# Patient Record
Sex: FEMALE | Race: WHITE | Employment: OTHER | URBAN - METROPOLITAN AREA
[De-identification: names, ages, dates, MRNs, and addresses within clinical notes are randomized per-mention and may not be internally consistent; named-entity substitution may affect disease eponyms.]

---

## 2017-03-07 PROBLEM — E66.01 SEVERE OBESITY (BMI 35.0-39.9) WITH COMORBIDITY (HCC): Status: ACTIVE | Noted: 2017-03-07

## 2018-03-27 PROBLEM — E66.01 SEVERE OBESITY (BMI 35.0-39.9) WITH COMORBIDITY (HCC): Status: RESOLVED | Noted: 2017-03-07 | Resolved: 2018-03-27

## 2018-04-19 ENCOUNTER — APPOINTMENT (OUTPATIENT)
Dept: GENERAL RADIOLOGY | Facility: HOSPITAL | Age: 68
DRG: 871 | End: 2018-04-19
Payer: MEDICARE

## 2018-04-19 ENCOUNTER — APPOINTMENT (OUTPATIENT)
Dept: MRI IMAGING | Facility: HOSPITAL | Age: 68
DRG: 871 | End: 2018-04-19
Attending: EMERGENCY MEDICINE
Payer: MEDICARE

## 2018-04-19 ENCOUNTER — APPOINTMENT (OUTPATIENT)
Dept: CT IMAGING | Facility: HOSPITAL | Age: 68
DRG: 871 | End: 2018-04-19
Attending: EMERGENCY MEDICINE
Payer: MEDICARE

## 2018-04-19 ENCOUNTER — HOSPITAL ENCOUNTER (INPATIENT)
Facility: HOSPITAL | Age: 68
LOS: 9 days | Discharge: HOME HEALTH | DRG: 871 | End: 2018-04-28
Admitting: HOSPITALIST
Payer: MEDICARE

## 2018-04-19 DIAGNOSIS — I63.9 CEREBROVASCULAR ACCIDENT (CVA), UNSPECIFIED MECHANISM (HCC): ICD-10-CM

## 2018-04-19 DIAGNOSIS — A41.9 SEPSIS DUE TO UNDETERMINED ORGANISM (HCC): Primary | ICD-10-CM

## 2018-04-19 DIAGNOSIS — R51.9 ACUTE NONINTRACTABLE HEADACHE, UNSPECIFIED HEADACHE TYPE: ICD-10-CM

## 2018-04-19 PROCEDURE — 70544 MR ANGIOGRAPHY HEAD W/O DYE: CPT | Performed by: EMERGENCY MEDICINE

## 2018-04-19 PROCEDURE — 71260 CT THORAX DX C+: CPT | Performed by: EMERGENCY MEDICINE

## 2018-04-19 PROCEDURE — B33RZZZ MAGNETIC RESONANCE IMAGING (MRI) OF INTRACRANIAL ARTERIES: ICD-10-PCS | Performed by: RADIOLOGY

## 2018-04-19 PROCEDURE — 71045 X-RAY EXAM CHEST 1 VIEW: CPT

## 2018-04-19 PROCEDURE — 70450 CT HEAD/BRAIN W/O DYE: CPT | Performed by: EMERGENCY MEDICINE

## 2018-04-19 RX ORDER — IBUPROFEN 600 MG/1
600 TABLET ORAL ONCE
Status: COMPLETED | OUTPATIENT
Start: 2018-04-19 | End: 2018-04-19

## 2018-04-19 RX ORDER — ACETAMINOPHEN 325 MG/1
650 TABLET ORAL EVERY 6 HOURS PRN
Status: DISCONTINUED | OUTPATIENT
Start: 2018-04-19 | End: 2018-04-28

## 2018-04-19 RX ORDER — IPRATROPIUM BROMIDE AND ALBUTEROL SULFATE 2.5; .5 MG/3ML; MG/3ML
3 SOLUTION RESPIRATORY (INHALATION) ONCE
Status: COMPLETED | OUTPATIENT
Start: 2018-04-19 | End: 2018-04-19

## 2018-04-19 RX ORDER — ACETAMINOPHEN 500 MG
1000 TABLET ORAL ONCE
Status: COMPLETED | OUTPATIENT
Start: 2018-04-19 | End: 2018-04-19

## 2018-04-19 RX ORDER — HEPARIN SODIUM 5000 [USP'U]/ML
5000 INJECTION, SOLUTION INTRAVENOUS; SUBCUTANEOUS EVERY 12 HOURS SCHEDULED
Status: DISCONTINUED | OUTPATIENT
Start: 2018-04-19 | End: 2018-04-22

## 2018-04-19 RX ORDER — HYDROCODONE BITARTRATE AND ACETAMINOPHEN 5; 325 MG/1; MG/1
1 TABLET ORAL EVERY 6 HOURS PRN
Status: DISCONTINUED | OUTPATIENT
Start: 2018-04-19 | End: 2018-04-25

## 2018-04-19 RX ORDER — HYDROMORPHONE HYDROCHLORIDE 1 MG/ML
0.5 INJECTION, SOLUTION INTRAMUSCULAR; INTRAVENOUS; SUBCUTANEOUS EVERY 2 HOUR PRN
Status: DISCONTINUED | OUTPATIENT
Start: 2018-04-19 | End: 2018-04-25

## 2018-04-19 RX ORDER — SODIUM CHLORIDE 0.9 % (FLUSH) 0.9 %
3 SYRINGE (ML) INJECTION AS NEEDED
Status: DISCONTINUED | OUTPATIENT
Start: 2018-04-19 | End: 2018-04-28

## 2018-04-19 RX ORDER — ACETAMINOPHEN 500 MG
TABLET ORAL
Status: COMPLETED
Start: 2018-04-19 | End: 2018-04-19

## 2018-04-19 RX ORDER — GUAIFENESIN 100 MG/5ML
100 SOLUTION ORAL EVERY 6 HOURS PRN
Status: DISCONTINUED | OUTPATIENT
Start: 2018-04-19 | End: 2018-04-28

## 2018-04-19 RX ORDER — DIPHENHYDRAMINE HYDROCHLORIDE 50 MG/ML
25 INJECTION INTRAMUSCULAR; INTRAVENOUS ONCE
Status: COMPLETED | OUTPATIENT
Start: 2018-04-19 | End: 2018-04-19

## 2018-04-19 RX ORDER — ONDANSETRON 2 MG/ML
4 INJECTION INTRAMUSCULAR; INTRAVENOUS EVERY 6 HOURS PRN
Status: DISCONTINUED | OUTPATIENT
Start: 2018-04-19 | End: 2018-04-28

## 2018-04-19 RX ORDER — HYDROMORPHONE HYDROCHLORIDE 1 MG/ML
1 INJECTION, SOLUTION INTRAMUSCULAR; INTRAVENOUS; SUBCUTANEOUS EVERY 2 HOUR PRN
Status: DISCONTINUED | OUTPATIENT
Start: 2018-04-19 | End: 2018-04-25

## 2018-04-19 RX ORDER — METOCLOPRAMIDE HYDROCHLORIDE 5 MG/ML
10 INJECTION INTRAMUSCULAR; INTRAVENOUS ONCE
Status: COMPLETED | OUTPATIENT
Start: 2018-04-19 | End: 2018-04-19

## 2018-04-19 RX ORDER — DEXAMETHASONE SODIUM PHOSPHATE 4 MG/ML
10 VIAL (ML) INJECTION ONCE
Status: COMPLETED | OUTPATIENT
Start: 2018-04-19 | End: 2018-04-19

## 2018-04-19 NOTE — ED NOTES
Care assumed from triage To room via [facility] Max assist of 2 to transfer to bed Responds to verbal stim, Presents from home in care of significant other with 3 day hx cough, fever, HA and L ear pain with verbalized \"bleeding from ear\" + tight bronchospastic c

## 2018-04-19 NOTE — H&P
[facility] Hospitalist H&P       CC: Patient presents with:  Breathing Problem  Ear Problem     PCP: Braxton Carias MD    ASSESSMENT / PLAN:     Ms. Aldo Valdez is a 77 F with PMH of HTN, HL, hypothyroidism who presented with fever, cough, L ear bleeding. about 10 years.      PMH  Past Medical History:   Diagnosis Date   • Essential hypertension    • Hyperlipidemia    • Thyroid disease         PSH  Past Surgical History:  4/16/2015: COLON CA SCRN NOT HI RSK IND N/A      Comment: Procedure: COLONOSCOPY, POSSI 04/19/2018    04/19/2018   K 2.7 04/19/2018   CL 96 04/19/2018   CO2 29 04/19/2018    04/19/2018   CA 8.8 04/19/2018   PTT 26.3 04/19/2018   INR 1.0 04/19/2018       No results for input(s): TROP in the last 72 hours.     Additional Diagnostics

## 2018-04-19 NOTE — ED PROVIDER NOTES
Patient Seen in: [facility] AND [facility] Emergency Department     History   Patient presents with:  Breathing Problem  Ear Problem    Stated Complaint: sob/cough/left ear bleeding    HPI    77year old female brought for evaluation of cough for the past 3 weeks, Comment: quit about 40 yrs. ago  Alcohol use: Yes               Review of Systems    Positive for stated complaint: sob/cough/left ear bleeding  Other systems are as noted in HPI. Constitutional and vital signs reviewed.       All other systems reviewed an Neurological: She is alert and oriented to person, place, and time. No cranial nerve deficit. She exhibits normal muscle tone. She displays no seizure activity. Coordination normal.   Skin: Skin is intact. No petechiae noted. She is not diaphoretic.  No cya CARDIAC/VASC: Borderline cardiomegaly with normal caliber pulmonary     vessels. MEDIAST/JOESPH:   No visible mass or adenopathy. LUNGS/PLEURA: Moderate elevation left hemidiaphragm. No significant     pulmonary parenchymal abnormalities.   No effus Temp: 102 °F (38.9 °C)    TempSrc: Temporal    SpO2: 94% 95%   Weight: 102.1 kg    Height: 175.3 cm (5' 9\")      *I personally reviewed and interpreted all ED vitals.     MDM     Emergency Differential Diagnosis: Sepsis, unknown source, given cough, pneumo overall clinical presentation including general toxicity and distal perfusion are no significant change.  hemodynamic function is no significant change.      Diagnostic Considerations and Interpretation of abnormal or significant results:  ED Course as of A Condition upon leaving the department: Stable

## 2018-04-19 NOTE — PROGRESS NOTES
[facility] Pharmacy Note: Antimicrobial Weight Dose Adjustment for: piperacillin/tazobactam ([place])    ZAKI Coronado is a 77year old female who has been prescribed piperacillin/tazobactam (ZOSYN) 3.375 gm every 8 hours.   CrCl is estimated creatinine clearance is

## 2018-04-19 NOTE — ED INITIAL ASSESSMENT (HPI)
Sob, cough, and general weakness. Also states her left ear bled this morning- no active bleeding in triage.

## 2018-04-20 ENCOUNTER — APPOINTMENT (OUTPATIENT)
Dept: ULTRASOUND IMAGING | Facility: HOSPITAL | Age: 68
DRG: 871 | End: 2018-04-20
Attending: HOSPITALIST
Payer: MEDICARE

## 2018-04-20 PROCEDURE — 93880 EXTRACRANIAL BILAT STUDY: CPT | Performed by: HOSPITALIST

## 2018-04-20 RX ORDER — 0.9 % SODIUM CHLORIDE 0.9 %
VIAL (ML) INJECTION
Status: COMPLETED
Start: 2018-04-20 | End: 2018-04-20

## 2018-04-20 RX ORDER — LEVOTHYROXINE SODIUM 0.12 MG/1
125 TABLET ORAL DAILY
Status: DISCONTINUED | OUTPATIENT
Start: 2018-04-20 | End: 2018-04-28

## 2018-04-20 RX ORDER — POTASSIUM CHLORIDE 20 MEQ/1
40 TABLET, EXTENDED RELEASE ORAL EVERY 4 HOURS
Status: COMPLETED | OUTPATIENT
Start: 2018-04-20 | End: 2018-04-20

## 2018-04-20 RX ORDER — IPRATROPIUM BROMIDE AND ALBUTEROL SULFATE 2.5; .5 MG/3ML; MG/3ML
3 SOLUTION RESPIRATORY (INHALATION) EVERY 4 HOURS PRN
Status: DISCONTINUED | OUTPATIENT
Start: 2018-04-20 | End: 2018-04-28

## 2018-04-20 RX ORDER — ATORVASTATIN CALCIUM 40 MG/1
40 TABLET, FILM COATED ORAL NIGHTLY
Status: DISCONTINUED | OUTPATIENT
Start: 2018-04-20 | End: 2018-04-28

## 2018-04-20 RX ORDER — MAGNESIUM OXIDE 400 MG (241.3 MG MAGNESIUM) TABLET
400 TABLET ONCE
Status: COMPLETED | OUTPATIENT
Start: 2018-04-20 | End: 2018-04-20

## 2018-04-20 NOTE — CM/SW NOTE
04/20/18 1200   CM/SW Screening   Referral Source Nurse;Case [facility] staff; Chart review;Nursing rounds   Patient's Mental Status Alert;Oriented   Patient's [address]   Patient lives with (significant other)   Patien

## 2018-04-20 NOTE — CONSULTS
Hem Onc:Consult Note          SUBJECTIVE:     Reason for Consultation: Lung mass    History of Present Illness:  Patient is a 77year old, female presented with complaints of cough, Sob, and fevers. Symptoms started about 3 weeks ago. No hemoptysis.  She a Surgical History:  4/16/2015: COLON CA SCRN NOT HI RSK IND N/A      Comment: Procedure: COLONOSCOPY, POSSIBLE BIOPSY,                POSSIBLE POLYPECTOMY 57537;  Surgeon:  Dolores Arreaga MD;  Location: [address] [place] [address], None.     MEDIASTINUM/VASCULATURE:           There is an enlarged 1.7 x 1.6 cm subcarinal lymph node. There is an enlarged 1.5 x 1.1 cm right lower paratracheal lymph node.  There are other multiple mildly prominent mediastinal lymph nodes which are nonspec segmental and subsegmental pulmonary arteries due to respiratory motion artifact. Dilated main pulmonary artery can be seen with pulmonary hypertension. 2.5 cm medial right upper lobe mass with mediastinal lymphadenopathy.  Findings are suspicious f vasculature.      Lab Results:   HEM:    Lab Results  Component Value Date   WBC 15.9 04/20/2018   RBC 3.68 04/20/2018   HGB 11.8 04/20/2018   HCT 34.8 04/20/2018   MCV 94.3 04/20/2018   MCH 31.9 04/20/2018   MCHC 33.8 04/20/2018   RDW 13.8 04/20/2018   PLT

## 2018-04-20 NOTE — PLAN OF CARE
Problem: Patient/Family Goals  Goal: Patient/Family Long Term Goal  Patient's Long Term Goal: Go home     Interventions:  - Monitor labs/vitals   - await results for MRI   - oncology consult   - manage pain   - See additional Care Plan goals for specific i breathe, Incentive Spirometry  - Assess the need for suctioning and perform as needed  - Assess and instruct to report SOB or any respiratory difficulty  - Respiratory Therapy support as indicated  - Manage/alleviate anxiety  - Monitor for signs/symptoms o environment to reduce risk of injury  - Provide assistive devices as appropriate  - Consider OT/PT consult to assist with strengthening/mobility  - Encourage toileting schedule  Outcome: Progressing  Fall risk precautions are in place with bed alarm on, ca

## 2018-04-20 NOTE — CM/SW NOTE
SW received order for advanced directives. SW met w/ pt to discuss eventual discharge needs. Pt lives at home w/ her significant other in [facility] [address]. Pt lives in [address] w/ 5 external steps and 7 stairs to the bedroom.  Pt has 2 daughters that are

## 2018-04-20 NOTE — CONSULTS
Pulmonary H&P/Consult     NAME: Miri Rico - ROOM: 641/[facility]-P - MRN: [id number] - Age: 77year old - :  4/10/1950    Date of Admission: 2018  2:15 PM  Admission Diagnosis: Sepsis due to undetermined [facility] [A41.9]  Acute nonintractable hea Onset   • Breast Cancer Mother      age 76      Smoking status: Former Smoker     Smokeless tobacco: Never Used    Comment: quit about 40 yrs.  ago    Alcohol use Yes     Medications:  • magnesium oxide  400 mg Oral Once   • Potassium Chloride ER  40 mEq Or where noted.

## 2018-04-20 NOTE — PHYSICAL THERAPY NOTE
PHYSICAL THERAPY EVALUATION - INPATIENT     Room Number: 441/125-O  Evaluation Date: 4/20/2018  Type of Evaluation: Initial   Physician Order: PT Eval and Treat    Presenting Problem: Fever, cough, SOB, weakness, hypoxia - Sepsis;  Headache, dizziness  Rollo Eduin needs in reach eating breakfast, RN aware. Patient will benefit from continued IP PT services to address these deficits in preparation for discharge.     DISCHARGE RECOMMENDATIONS  PT Discharge Recommendations: Undetermined;Cont skilled therapy in a supe been very ill and is dying, currently in hospice and she has been visiting him regularly.     SUBJECTIVE  \"I feel horrible but I want to sit up to eat\"    PHYSICAL THERAPY EXAMINATION     OBJECTIVE  Precautions: Hard of hearing;Bed/chair alarm (Bed alarm in hospital room?: A Lot   -   Climbing 3-5 steps with a railing?: A Lot     AM-PAC Score:  Raw Score: 16   PT Approx Degree of Impairment Score: 54.16%   Standardized Score (AM-PAC Scale): 40.78   CMS Modifier (G-Code): CK    FUNCTIONAL ABILITY STATUS  Ga Goal #6  Current Status

## 2018-04-20 NOTE — PROGRESS NOTES
[facility] Hospitalist Progress Note     CC: Hospital Follow up    PCP: Segundo Junior MD       Assessment/Plan:     Principal Problem:    Sepsis due to undetermined organism [facility]  Active Problems:    Acute nonintractable headache, unspecified headache t significant cough. OBJECTIVE:    Blood pressure 124/57, pulse 71, temperature 98.7 °F (37.1 °C), temperature source Oral, resp. rate 18, height 175.3 cm (5' 9\"), weight 225 lb (102.1 kg), SpO2 94 %.     Temp:  [98.1 °F (36.7 °C)-102 °F (38.9 °C)] 98.7 Date: 4/19/2018  CONCLUSION:  Mild chronic microvascular ischemic disease without acute intracranial abnormality. Severe diffuse paranasal sinus disease. Near-complete opacification of the bilateral mastoid air cells may be sequela of inflammation.  No o to be less likely but is also in the differential.    Dictated by (CST): Tristian Benson MD on 4/19/2018 at 17:03     Approved by (CST): Tristian Benson MD on 4/19/2018 at 17:13              Meds:     • atorvastatin  40 mg Oral Nightly   • Levothyroxine Sodium

## 2018-04-20 NOTE — OCCUPATIONAL THERAPY NOTE
OCCUPATIONAL THERAPY EVALUATION - INPATIENT     Room Number: 254/379-V  Evaluation Date: 4/20/2018  Type of Evaluation: Initial  Presenting Problem:  (sepsis, sob.cough)    Physician Order: IP Consult to Occupational Therapy  Reason for Therapy: ADL/IADL D Problem:    Sepsis due to undetermined organism [facility])  Active Problems:    Acute nonintractable headache, unspecified headache type      Past Medical History  Past Medical History:   Diagnosis Date   • Essential hypertension    • Hyperlipidemia    • Thyroi Motor: WFL       ACTIVITIES OF DAILY LIVING ASSESSMENT  AM-PAC ‘6-Clicks’ Inpatient Daily Activity Short Form  How much help from another person does the patient currently need…  -   Putting on and taking off regular lower body clothing?: A Little  -   [email or web address]

## 2018-04-20 NOTE — PLAN OF CARE
Problem: Patient/Family Goals  Goal: Patient/Family Long Term Goal  Patient's Long Term Goal: Go home     Interventions:  - Monitor labs/vitals   - await results for MRI   - oncology consult   - manage pain   - See additional Care Plan goals for specific i Verbalizes/displays adequate comfort level or patient's stated pain goal  INTERVENTIONS:  - Encourage pt to monitor pain and request assistance  - Assess pain using appropriate pain scale  - Administer analgesics based on type and severity of pain and eval continue to monitor.

## 2018-04-21 RX ORDER — POTASSIUM CHLORIDE 20 MEQ/1
40 TABLET, EXTENDED RELEASE ORAL EVERY 4 HOURS
Status: COMPLETED | OUTPATIENT
Start: 2018-04-21 | End: 2018-04-21

## 2018-04-21 RX ORDER — ECHINACEA PURPUREA EXTRACT 125 MG
2 TABLET ORAL
Status: DISCONTINUED | OUTPATIENT
Start: 2018-04-21 | End: 2018-04-28

## 2018-04-21 RX ORDER — TRAZODONE HYDROCHLORIDE 50 MG/1
25 TABLET ORAL NIGHTLY PRN
Status: DISCONTINUED | OUTPATIENT
Start: 2018-04-21 | End: 2018-04-28

## 2018-04-21 RX ORDER — BENZONATATE 100 MG/1
200 CAPSULE ORAL 3 TIMES DAILY PRN
Status: DISCONTINUED | OUTPATIENT
Start: 2018-04-21 | End: 2018-04-28

## 2018-04-21 RX ORDER — 0.9 % SODIUM CHLORIDE 0.9 %
VIAL (ML) INJECTION
Status: COMPLETED
Start: 2018-04-21 | End: 2018-04-21

## 2018-04-21 NOTE — PROGRESS NOTES
Dr. Yadira Vasquez paged, per tele patient had 2.5 sec run of SVT -214; patient asymptomatic.  /55, HR 79, pox 97% O2 3L. Patient also has fever  awaiting call back.   Dr Hira Stanton on call for Dr. Yadira Vasquez notfied of above, no new orders, continue to

## 2018-04-21 NOTE — PROGRESS NOTES
Pulmonary Progress Note     Assessment / Plan:  1. Abnormal CT Chest - RUL 2.5cm medial nodule with associated mediastinal lymphadenopathy. There is also an enlarged right chest wall lymph node of unclear significance.  DDx includes malignancy and infectiou

## 2018-04-21 NOTE — CONSULTS
ENT consult  Asked to see patient by Dr. Clari Hope and discussed case with   Coni Max. 76 y/o female admitted 2 days ago with fever to 102. She subsequently has positive blood cultures for H influenza.   Her CT shows both pansinusitis and bilateral fluid

## 2018-04-21 NOTE — CONSULTS
[facility]    PATIENT'S NAME: Philly HARVEY   ATTENDING PHYSICIAN: Hayden Orozco. Priya Duffy MD   CONSULTING PHYSICIAN: Jennifer Hernandez MD   PATIENT ACCOUNT#:   [id number]    LOCATION:  [address] [place] [address] #:   [id number]       DATE OF BIR masses, rebound, or organomegaly. EXTREMITIES:  No clubbing, cyanosis, edema, or phlebitis appreciated. IV site okay. LABORATORY DATA:  White count is 15.3, hemoglobin 11.1, platelets 003,708. BUN 11, creatinine 0.67.   Blood cultures have grown Haemo

## 2018-04-21 NOTE — PROGRESS NOTES
[facility] Hospitalist Progress Note     CC: Hospital Follow up    PCP: Fabian Lin MD       Assessment/Plan:     Principal Problem:    Sepsis due to undetermined organism [facility]  Active Problems:    Acute nonintractable headache, unspecified headache t OBJECTIVE:    Blood pressure 135/71, pulse 71, temperature 97.6 °F (36.4 °C), temperature source Oral, resp. rate 18, height 5' 9\" (1.753 m), weight 225 lb (102.1 kg), SpO2 96 %.     Temp:  [97.6 °F (36.4 °C)-101.5 °F (38.6 °C)] 97.6 °F (36.4 °C)  Puls acute findings.      Dictated by (CST): Mckenzie Nina MD on 4/19/2018 at 15:17     Approved by (CST): Nain Umaña MD on 4/19/2018 at 15:18          Ct Brain Or Head (91258)    Result Date: 4/19/2018  CONCLUSION:  Mild chronic microvasc embolus in the central, main or lobar pulmonary arteries. Nondiagnostic in the segmental and subsegmental pulmonary arteries due to respiratory motion artifact. Dilated main pulmonary artery can be seen with pulmonary hypertension.   2.5 cm medial right up

## 2018-04-21 NOTE — CONSULTS
Hem Onc:Consult Note          SUBJECTIVE:     Reason for Consultation: Lung mass    Interval history:  Noted to have H. Influenza bacteremia and pneumonia. On antibiotics per ID and ENT.     at bedside          Current medications:    Current Facili normocephalic, no scleral icterus, oropharynx clear  Neck: supple, no adenopathy  Heart: regular rate  Lungs: coughing; decreased breath sounds.    Abdomen: soft, non-tender, non-distended  Extremity: no LE edema  Skin: No rashes or lesions  Lymph nodes: Ce mass abutting the medial aspect of the pleura. There are bibasilar airspace opacities, suggestive of   atelectasis. There is volume loss the left hemithorax.  Decreased anterior posterior dimension trachea with anterior bowing of the posterior wall suggesti TECHNIQUE:    CT images were obtained without contrast material.  Automated exposure control for dose reduction was used.        FINDINGS:          PARENCHYMA:             Diffuse low attenuation is seen within the bilateral periventricular white matter c ENT  2. pulm rex appreciated; plan for EBUS in near future; may be outpatient pending   3. Discussed CT scan results and findings in the lung which can be from infection, but also concerning for malignancy.    4. Discussed that we need biopsy to confirm th

## 2018-04-21 NOTE — CONSULTS
Ferdinand Jones is a 77year old female.    Patient presents with:  Breathing Problem  Ear Problem      HPI:    No flu shot this year ;did get pneumovax;lost hearing 48 hrs pta;sick father had uri prior to pt and she feels like these are same sx  Fluid out of suggested  4. Follow cxr ;hopefully this is all infection;quit cigs ~ 35+ yrs ago  5.  Thanks, #[id number]            Lab Results  Component Value Date   WBC 15.3 04/21/2018   HGB 11.1 04/21/2018   HCT 32.5 04/21/2018    04/21/2018   CREATSERUM 0.67 04/21/2 Microscopic Microscopic not indicated    -PROTHROMBIN TIME (PT)   Result Value Ref Range   PT 13.2 11.8 - 14.5 seconds   INR 1.0 0.9 - 1.2   -PTT, ACTIVATED   Result Value Ref Range   PTT 26.3 23.2 - 35.3 seconds   -MAGNESIUM   Result Value Ref Range   M Auto Resulted    -RAINBOW DRAW DARK GREEN   Result Value Ref Range   Hold Lt Green Auto Resulted    -RAINBOW DRAW LIGHT GREEN   Result Value Ref Range   Hold Lt Green Auto Resulted    -RAINBOW DRAW GOLD   Result Value Ref Range   Hold Gold Auto Resulted 37.0 g/dl   RDW 13.7 11.0 - 15.0 %    140 - 400 K/UL   MPV 8.6 7.4 - 10.3 fL   Neutrophil % 77 %   Lymphocyte % 12 %   Monocyte % 11 %   Eosinophil % 0 %   Basophil % 1 %   Neutrophil Absolute 8.3 (H) 1.8 - 7.7 K/UL   Lymphocyte Absolute 1.3 1.0 - 4

## 2018-04-22 ENCOUNTER — APPOINTMENT (OUTPATIENT)
Dept: CV DIAGNOSTICS | Facility: HOSPITAL | Age: 68
DRG: 871 | End: 2018-04-22
Attending: HOSPITALIST
Payer: MEDICARE

## 2018-04-22 ENCOUNTER — APPOINTMENT (OUTPATIENT)
Dept: MRI IMAGING | Facility: HOSPITAL | Age: 68
DRG: 871 | End: 2018-04-22
Attending: EMERGENCY MEDICINE
Payer: MEDICARE

## 2018-04-22 PROCEDURE — 99223 1ST HOSP IP/OBS HIGH 75: CPT | Performed by: OTHER

## 2018-04-22 PROCEDURE — 93306 TTE W/DOPPLER COMPLETE: CPT | Performed by: HOSPITALIST

## 2018-04-22 PROCEDURE — 70553 MRI BRAIN STEM W/O & W/DYE: CPT | Performed by: EMERGENCY MEDICINE

## 2018-04-22 RX ORDER — SODIUM CHLORIDE 0.9 % (FLUSH) 0.9 %
10 SYRINGE (ML) INJECTION AS NEEDED
Status: DISCONTINUED | OUTPATIENT
Start: 2018-04-22 | End: 2018-04-28

## 2018-04-22 RX ORDER — HEPARIN SODIUM 5000 [USP'U]/ML
5000 INJECTION, SOLUTION INTRAVENOUS; SUBCUTANEOUS EVERY 12 HOURS SCHEDULED
Status: DISCONTINUED | OUTPATIENT
Start: 2018-04-22 | End: 2018-04-22

## 2018-04-22 RX ORDER — HEPARIN SODIUM 5000 [USP'U]/ML
5000 INJECTION, SOLUTION INTRAVENOUS; SUBCUTANEOUS EVERY 12 HOURS SCHEDULED
Status: DISCONTINUED | OUTPATIENT
Start: 2018-04-22 | End: 2018-04-28

## 2018-04-22 RX ORDER — POTASSIUM CHLORIDE 20 MEQ/1
40 TABLET, EXTENDED RELEASE ORAL ONCE
Status: COMPLETED | OUTPATIENT
Start: 2018-04-22 | End: 2018-04-22

## 2018-04-22 RX ORDER — SODIUM CHLORIDE 9 MG/ML
INJECTION, SOLUTION INTRAVENOUS CONTINUOUS
Status: DISCONTINUED | OUTPATIENT
Start: 2018-04-22 | End: 2018-04-28

## 2018-04-22 NOTE — PHYSICAL THERAPY NOTE
PHYSICAL THERAPY TREATMENT NOTE - INPATIENT     Room Number: 980/845-M       Presenting Problem: Fever, cough, SOB, weakness, hypoxia - Sepsis;  Headache, dizziness    Problem List  Principal Problem:    Sepsis due to undetermined organism [facility])  Active Pro Static Sitting: Good  Dynamic Sitting: Fair +           Static Standing: Fair -  Dynamic Standing: Poor +    ACTIVITY TOLERANCE  O2 Device: Nasal cannula  Liters of O2:  3  No shortness of breath    AM-PAC '6-Clicks' INPATIENT SHORT FORM - Goal #3 Patient is able to ambulate 300 feet with assist device: walker - rolling at assistance level: modified independent   Goal #3   Current Status 15 feet with rolling walker Min assist    Goal #4 Patient will negotiate 7 stairs/one curb w/ assistive

## 2018-04-22 NOTE — PROGRESS NOTES
Sherryle Linea is a 77year old female. Patient presents with:  Breathing Problem  Ear Problem    HPI:    Ongoing headaches and [place]    REVIEW OF SYSTEMS:   A comprehensive 10 point review of systems was completed.   Pertinent positives and negatives noted i Urine 40  (A) Negative mg/dL   WBC Urine 0 0 - 5 /HPF   RBC URINE 0 0 - 3 /HPF   Bacteria Urine Negative None Seen /HPF   Microscopic Microscopic not indicated    -PROTHROMBIN TIME (PT)   Result Value Ref Range   PT 13.2 11.8 - 14.5 seconds   INR 1.0 0.9 - Result Value Ref Range   Potassium 4.3 3.3 - 5.1 mmol/L   -BASIC METABOLIC PANEL (8)   Result Value Ref Range   Glucose 97 70 - 99 mg/dL   Sodium 138 136 - 144 mmol/L   Potassium 3.8 3.3 - 5.1 mmol/L   Chloride 104 95 - 110 mmol/L   CO2 28 22 - 32 mmol/L Haemophilus influenza (A)    Blood Smear Positive Blood Culture    Blood Smear Gram Negative Rods    -BLOOD CULTURE   Result Value Ref Range   Blood Culture Result Haemophilus influenza (A)    Blood Smear Positive Blood Culture    Blood Smear Gram Negative RBC 3.42 (L) 3.70 - 5.40 M/UL   HGB 10.9 (L) 12.0 - 16.0 g/dL   HCT 32.5 (L) 35.0 - 48.0 %   MCV 95.1 80.0 - 100.0 fL   MCH 31.9 27.0 - 32.0 pg   MCHC 33.6 32.0 - 37.0 g/dl   RDW 14.0 11.0 - 15.0 %    140 - 400 K/UL   MPV 8.5 7.4 - 10.3 fL   Neutr

## 2018-04-22 NOTE — CONSULTS
Neurology Inpatient Consult Note    Rachael Johnson : 4/10/1950   Referring Physician: Dr. Jhony Clarke  HPI:     Rachael Johnson is a 77year old female who is being seen in neurologic evaluation.     Patient is being seen in evaluation for multiple emboli se kathy  HEENT: See HPI  RESPIRATORY: See HPI  CARDIOVASCULAR: no chest pain, no palpitations  GI: no nausea, no vomiting  GENITAL/: no dysuria, no frequency  MUSCULOSKELETAL: General weakness  PSYCH: no depression, no anxiety  NEURO: see HPI    EXAM: bifurcation/proximal ICA plaque without hemodynamic significance. 2. Nonvisualized right vertebral artery/occlusion of undetermined age corresponds to recent MRA Brain and CT chest (4/19/18). .   3. Antegrade flow left vertebral artery.        MRA brain  CO involve cardiology for KRIS    –Final blood culture results pending    –Continue statin; LDL is at goal less than 70    –Due to high risk of hemorrhagic conversion in the case of septic emboli, would hold off on antiplatelet and full dose anticoagulation fo

## 2018-04-22 NOTE — PROGRESS NOTES
[facility] Hospitalist Progress Note     CC: Hospital Follow up    PCP: Briseyda Jarvis MD       Assessment/Plan:     Principal Problem:    Sepsis due to undetermined organism [facility]  Active Problems:    Acute nonintractable headache, unspecified headache t DO  [facility] Hospitalist  Answering Service number: [phone]     Subjective:     Feels very tired, still w/ cough but a little better compared to yesterday  Denies cp, abd pain, n/v     OBJECTIVE:    Blood pressure 135/69, pulse 84, temperature 98.3 °F (36. --   28    < > = values in this interval not displayed. No results for input(s): ALT, AST, ALB, AMYLASE, LIPASE, LDH in the last 72 hours.     Invalid input(s): ALPHOS, TBIL, DBIL, TPROT      Imaging:  Xr Chest Ap/pa (1 View) (cpt=71045)    Result John intraventricular restricted diffusion within the atria right lateral ventricle and the left occipital horn. No abnormal enhancement or ventriculitis. Presumed subacute IVH isodense on recent noncontrast CT from 4/19/18. 3. Cerebral cortical atrophy.  Chroni Dictated by (CST): Andrew Castellanos MD on 4/19/2018 at 17:03     Approved by (CST): Andrew Castellanos MD on 4/19/2018 at 17:13              Meds:     • cefTRIAXone  2 g Intravenous Q12H   • Saline Nasal Spray  2 spray Each Nare TID   • atorvastatin  40 mg Oral N

## 2018-04-22 NOTE — PROGRESS NOTES
Order received to transfer patient to CV. Report given to Kymberly Valadez in agreement with transfer, patient and dtr, Naoma Peak in agreement with transfer. Patient transferred per bed with tele.

## 2018-04-22 NOTE — PLAN OF CARE
Problem: Patient/Family Goals  Goal: Patient/Family Long Term Goal  Patient's Long Term Goal: Go home     Interventions:  - Monitor labs/vitals   - await results for MRI   - oncology consult   - manage pain   - See additional Care Plan goals for specific i ADULT  Goal: Verbalizes/displays adequate comfort level or patient's stated pain goal  INTERVENTIONS:  - Encourage pt to monitor pain and request assistance  - Assess pain using appropriate pain scale  - Administer analgesics based on type and severity of

## 2018-04-22 NOTE — PROGRESS NOTES
ENT  Patient not available to see in MRI  Febrile yesterday 101.5 prior to the rocephin and 101.2 at 8:30 pm.  No fever since then. Spoke to her son at the bedside who reports that she is beginning to feel better. Imp: H.  Influenza bacteremia source eith

## 2018-04-23 ENCOUNTER — APPOINTMENT (OUTPATIENT)
Dept: CV DIAGNOSTICS | Facility: HOSPITAL | Age: 68
DRG: 871 | End: 2018-04-23
Attending: INTERNAL MEDICINE
Payer: MEDICARE

## 2018-04-23 ENCOUNTER — APPOINTMENT (OUTPATIENT)
Dept: INTERVENTIONAL RADIOLOGY/VASCULAR | Facility: HOSPITAL | Age: 68
DRG: 871 | End: 2018-04-23
Attending: INTERNAL MEDICINE
Payer: MEDICARE

## 2018-04-23 PROCEDURE — 93320 DOPPLER ECHO COMPLETE: CPT | Performed by: INTERNAL MEDICINE

## 2018-04-23 PROCEDURE — 93325 DOPPLER ECHO COLOR FLOW MAPG: CPT | Performed by: INTERNAL MEDICINE

## 2018-04-23 PROCEDURE — 99231 SBSQ HOSP IP/OBS SF/LOW 25: CPT | Performed by: OTHER

## 2018-04-23 PROCEDURE — B246ZZ4 ULTRASONOGRAPHY OF RIGHT AND LEFT HEART, TRANSESOPHAGEAL: ICD-10-PCS | Performed by: INTERNAL MEDICINE

## 2018-04-23 RX ORDER — ASPIRIN 81 MG/1
81 TABLET, CHEWABLE ORAL DAILY
Status: DISCONTINUED | OUTPATIENT
Start: 2018-04-23 | End: 2018-04-28

## 2018-04-23 RX ORDER — MIDAZOLAM HYDROCHLORIDE 1 MG/ML
INJECTION INTRAMUSCULAR; INTRAVENOUS
Status: COMPLETED
Start: 2018-04-23 | End: 2018-04-23

## 2018-04-23 RX ORDER — MIDAZOLAM HYDROCHLORIDE 1 MG/ML
INJECTION INTRAMUSCULAR; INTRAVENOUS
Status: DISCONTINUED
Start: 2018-04-23 | End: 2018-04-23 | Stop reason: WASHOUT

## 2018-04-23 RX ORDER — MIDAZOLAM HYDROCHLORIDE 1 MG/ML
4 INJECTION INTRAMUSCULAR; INTRAVENOUS ONCE
Status: COMPLETED | OUTPATIENT
Start: 2018-04-23 | End: 2018-04-23

## 2018-04-23 NOTE — PROGRESS NOTES
Ferdinand Jones  [id number]  [unfilled]    Post procedure/ recovery hand-off report given to [facility].  Patient's vital signs stable,     Lino Wilson RN

## 2018-04-23 NOTE — PHYSICAL THERAPY NOTE
PHYSICAL THERAPY TREATMENT NOTE - INPATIENT     Room Number: 715/934-W       Presenting Problem: Fever, cough, SOB, weakness, hypoxia - Sepsis;  Headache, dizziness    Problem List  Principal Problem:    Sepsis due to undetermined organism [facility])  Active Pro walk in hospital room?: A Little   -   Climbing 3-5 steps with a railing?: A Little     AM-PAC Score:  Raw Score: 18   PT Approx Degree of Impairment Score: 46.58%   Standardized Score (AM-PAC Scale): 43.63   CMS Modifier (G-Code): CK    FUNCTIONAL ABILITY

## 2018-04-23 NOTE — PROGRESS NOTES
Still not doing very well. She still has intermittent fever and seems to be relatively lethargic. Is on her way to have a KRIS performed    I reviewed her MRI which does show some evidence of sinusitis.   It is possible that her sinuses or an ear infection

## 2018-04-23 NOTE — PLAN OF CARE
Problem: Patient/Family Goals  Goal: Patient/Family Long Term Goal  Patient's Long Term Goal: Go home     Interventions:  - Monitor labs/vitals   - await results for MRI   - oncology consult   - manage pain   - See additional Care Plan goals for specific i Verbalizes/displays adequate comfort level or patient's stated pain goal  INTERVENTIONS:  - Encourage pt to monitor pain and request assistance  - Assess pain using appropriate pain scale  - Administer analgesics based on type and severity of pain and eval Monitor lab trends   Outcome: Progressing      Problem: NEUROLOGICAL - ADULT  Goal: Achieves stable or improved neurological status  INTERVENTIONS  - Assess for and report changes in neurological status  - Initiate measures to prevent increased intracrania provide adequate pain relief. Before administration of dilaudid explained KRIS procedure to patient, gave education material explaining procedure to patient, answered any questions, and patient signed consent. Patient much more relaxed after dilaudid.

## 2018-04-23 NOTE — PROGRESS NOTES
[facility] Hospitalist Progress Note     PCP: Farhad Conteh MD    CC: Patient presents with:  Breathing Problem  Ear Problem         Assessment/Plan:       Garret Parra is a 77year old female with PMH of HTN, hypothyroidism, who presents with fever, c (Last 24 hours) at 04/23/18 1654  Last data filed at 04/23/18 0700   Gross per 24 hour   Intake              471 ml   Output              950 ml   Net             -479 ml       Last 3 Weights  04/23/18 0700 : 224 lb 14.4 oz (102 kg)  04/19/18 1351 : 225 lb Lab  04/23/18   0508   ALT  81*   AST  76*   ALB  2.8*       No results for input(s): PGLU in the last 72 hours. No results for input(s): TROP in the last 72 hours.     Imaging:  Xr Chest Ap/pa (1 View) (cpt=71045)    Result Date: 4/19/2018  PROCEDURE: Visualized orbits are unremarkable. CALVARIUM:     No acute osseous abnormality. OTHER:                There is atherosclerotic calcification of the intracranial vasculature.         CONCLUSION:  Mild chronic microvascular ischemic disease without acute basilar artery which may represent decreased flow. 2.  Right vertebral artery is not identified. A right PICA is partially seen.   This appearance suggests the right vertebral terminates as a PICA however the right PICA could be opacified by collateral cir noncontrast CT from 4/19/18. No abnormal enhancement or evidence of ventriculitis on postcontrast imaging. SKULL: No mass or other significant visible lesion.   SINUSES: Pansinusitis with extensive sinus mucosal thickening patient of the sphenoid ethmoid ri measurements for carotid artery narrowing or stenosis were obtained using the ipsilateral distal internal carotid artery as the reference value.   (NASCET criteria) FINDINGS:  PEAK FLOW VELOCITIES (cm/sec): RIGHT CAROTID: Mild plaque carotid bifurcation/pro MEDIASTINUM/VASCULATURE: There is an enlarged 1.7 x 1.6 cm subcarinal lymph node. There is an enlarged 1.5 x 1.1 cm right lower paratracheal lymph node.  There are other multiple mildly prominent mediastinal lymph nodes which are nonspecific and measure les respiratory motion artifact. Dilated main pulmonary artery can be seen with pulmonary hypertension. 2.5 cm medial right upper lobe mass with mediastinal lymphadenopathy. Findings are suspicious for primary lung neoplasm.  Further evaluation with PET/CT an

## 2018-04-23 NOTE — PROGRESS NOTES
[facility] AND [facility] Infectious Disease  Progress Note    Venu Grace Patient Status:  Inpatient    4/10/1950 MRN [id number]   Location [facility] 3W/SW Attending Chichi Tolentino DO   Hosp Day # 4 PCP Jarrett Rodriguez MD     Subjective:  Pa cultures negative  - IV ceftriaxone ongoing    2. Abnormal CT chest with a 2.5cm nodule and lymphadenopathy  - Will need biopsy once improved, possibly as an outpatient per pulmonary    3.  h/o ischemic CVAs  - KRIS today - \"valves clean\"    4.   Disposit

## 2018-04-23 NOTE — PROGRESS NOTES
Neurology Inpatient Follow-up Note      HPI:     Patient seen in follow-up.  at bedside.       Past Medical Hisotory:  Reviewed    Medications:  Reviewed    Allergies:    Codeine                 Itching      ROS:   GENERAL: no weight loss or fevers stable      Neurology service will continue to follow. Please page with any questions / concerns.     Grace Castañeda MD  [address] [place] [address] [phone]

## 2018-04-24 ENCOUNTER — APPOINTMENT (OUTPATIENT)
Dept: ULTRASOUND IMAGING | Facility: HOSPITAL | Age: 68
DRG: 871 | End: 2018-04-24
Attending: INTERNAL MEDICINE
Payer: MEDICARE

## 2018-04-24 PROCEDURE — 93970 EXTREMITY STUDY: CPT | Performed by: INTERNAL MEDICINE

## 2018-04-24 RX ORDER — SUMATRIPTAN 6 MG/.5ML
6 INJECTION, SOLUTION SUBCUTANEOUS ONCE
Status: COMPLETED | OUTPATIENT
Start: 2018-04-24 | End: 2018-04-24

## 2018-04-24 RX ORDER — SODIUM CHLORIDE 9 MG/ML
INJECTION, SOLUTION INTRAVENOUS
Status: COMPLETED
Start: 2018-04-24 | End: 2018-04-24

## 2018-04-24 NOTE — PLAN OF CARE
Problem: PAIN - ADULT  Goal: Verbalizes/displays adequate comfort level or patient's stated pain goal  INTERVENTIONS:  - Encourage pt to monitor pain and request assistance  - Assess pain using appropriate pain scale  - Administer analgesics based on type intracranial pressure  - Maintain blood pressure and fluid volume within ordered parameters to optimize cerebral perfusion and minimize risk of hemorrhage  - Monitor temperature, glucose, and sodium.  Initiate appropriate interventions as ordered   Outcome:

## 2018-04-24 NOTE — CM/SW NOTE
4/24CM-The Patient has [employer]. This Writer has left a message for the Patient's insurance in regards to appropriate discharging planning. Case Management will update as information becomes available.  [employer] determination is needed prior to disc

## 2018-04-24 NOTE — PROGRESS NOTES
Pulmonary Progress Note      NAME: Rafiq Stout - ROOM: 337/[facility]-A - MRN: [id number] - Age: 77year old - : 4/10/1950    Assessment/Plan:  1. Abnormal CT Chest - RUL 2.5cm medial nodule with associated mediastinal lymphadenopathy.  There is also an enlarg dimiminished throughout   Chest wall: No tenderness or deformity. Heart: Regular rate and rhythm, normal S1S2, no murmur. Abdomen: soft, non-tender, non-distended, positive BS.    Extremity: no edema    Recent Labs   Lab  04/24/18   0613   RBC  3.92   H

## 2018-04-24 NOTE — PROGRESS NOTES
Hematology/Oncology  Progress Note        NAME: Milton Stout - ROOM: 259/245-Q - MRN: [id number] - Age: 77year old - : 4/10/1950    Subjective:  No acute events overnight.  at bedside. She is complaining of headache.      Medications:  • aspirin BS.   Extremity: no edema   Skin: No rashes or lesions.     Recent Labs   Lab  04/24/18   0613   RBC  3.92   HGB  12.3   HCT  37.0   MCV  94.4   MCH  31.4   MCHC  33.3   RDW  13.7   WBC  12.9*   PLT  339     Recent Labs   Lab  04/22/18   0555  04/23/18   05

## 2018-04-24 NOTE — PROGRESS NOTES
[facility] Infectious Disease  Progress Note    Dubois Downy Patient Status:  Inpatient    4/10/1950 MRN [id number]   Location [facility] 3W/SW Attending Brad Rosas,    Hosp Day # 5 PCP Claudean Palmer, MD     Subjective:  Pa ischemic CVAs  - KRIS today - \"valves clean\"     4.  Disposition - inpatient. Continue IV ceftraixone as Rx. All repeat cultures negative. Anticipate ultimate transition to p.o. Rx when ready for d/c.   Longer course may be prudent given mastoid involve

## 2018-04-24 NOTE — PROGRESS NOTES
[facility] Hospitalist Progress Note     PCP: Liborio Vargas MD    CC: Patient presents with:  Breathing Problem  Ear Problem         Assessment/Plan:       Lamont Carlton is a 77year old female with PMH of HTN, hypothyroidism, who presents with fever, c °C)  Pulse:  [73-94] 73  Resp:  [16-18] 16  BP: (121-153)/(75-85) 121/75    Intake/Output:    Intake/Output Summary (Last 24 hours) at 04/24/18 1441  Last data filed at 04/24/18 1100   Gross per 24 hour   Intake              720 ml   Output             132 --   28  29  28   BUN   --   6*  8   --    CREATSERUM   --   0.56  0.52   --    CA   --   8.1*  8.4*   --    GLU   --   97  98   --        Recent Labs   Lab  04/23/18   0508   ALT  81*   AST  76*   ALB  2.8*       No results for input(s): PGLU in the last sphenoid sinus with moderate mucosal thickening of the left sphenoid sinus. Near complete opacification of bilateral mastoid sinuses. ORBITS:                 Visualized orbits are unremarkable. CALVARIUM:     No acute osseous abnormality.   OTHER: OTHER: Negative. CONCLUSION:  1. Normal left vertebral artery with equal caliber of the basilar artery. There is decreased signal at the origin of the basilar artery which may represent decreased flow. 2.  Right vertebral artery is not identified. restricted diffusion in the left occipital horn and atria the right lateral ventricle suggesting subacute intraventricular hemorrhage was not evident on recent noncontrast CT from 4/19/18.  No abnormal enhancement or evidence of ventriculitis on postcontras TECHNIQUE: Color duplex Doppler ultrasound and pulsed Doppler analysis were performed to evaluate the cervical carotid arteries and vertebral flow.   All measurements for carotid artery narrowing or stenosis were obtained using the ipsilateral distal intern GRAYSCALE: There are no visible echogenic thrombi in either lower extremity. Compressibility of both the venous systems is demonstrated. COLOR DOPPLER: Flow is present throughout the imaged veins of both lower extremities.  SPECTRAL: Where interrogated, aug and is otherwise unremarkable. LUNGS AND PLEURA: Small tracheal diverticulum is partially visualized. Within the medial aspect of the right upper lobe there is a 2.5 x 1.9 cm mass abutting the medial aspect of the pleura.  There are bibasilar airspace opac Dictated by (CST): Charlie Pham MD on 4/19/2018 at 17:03     Approved by (CST): Charlie Pham MD on 4/19/2018 at 17:13

## 2018-04-24 NOTE — OCCUPATIONAL THERAPY NOTE
RN provided consent for patient to be seen;  Attempted to work with patient this afternoon for OT treatment session; pt presented in room, supine in bed, pillow 1/2 covering her head with all the blinds drawn c/o of continued severe headache; patient signif

## 2018-04-25 PROCEDURE — 99232 SBSQ HOSP IP/OBS MODERATE 35: CPT | Performed by: OTHER

## 2018-04-25 RX ORDER — SUMATRIPTAN 50 MG/1
50 TABLET, FILM COATED ORAL EVERY 2 HOUR PRN
Status: DISCONTINUED | OUTPATIENT
Start: 2018-04-25 | End: 2018-04-28

## 2018-04-25 RX ORDER — POTASSIUM CHLORIDE 20 MEQ/1
40 TABLET, EXTENDED RELEASE ORAL ONCE
Status: COMPLETED | OUTPATIENT
Start: 2018-04-25 | End: 2018-04-25

## 2018-04-25 NOTE — PROGRESS NOTES
[facility] Hospitalist Progress Note     PCP: Alvina Mark MD    CC: Patient presents with:  Breathing Problem  Ear Problem         Assessment/Plan:       Darion Stout is a 77year old female with PMH of HTN, hypothyroidism, who presents with fever, co morning. She is feeling much better today, headache has now resolved and she is tolerating breakfast without issue. Willing to try working with PT today. Afebrile overnight.       Objective     OBJECTIVE:  Temp:  [98 °F (36.7 °C)-99.1 °F (37.3 °C)] 98 °F (3 HGB  12.1  12.3  12.2   MCV  95.0  94.4  94.9   PLT  294  339  367       Recent Labs   Lab  04/23/18   0508  04/24/18   0613  04/25/18   0538   NA  136  137  136   K  4.1  4.1  3.9  3.7   CL  99  101  101   CO2  29  28  28   BUN  8   --   11   CREATSERUM thickening of the ethmoid air cells. Severe mucosal thickening of the right maxillary sinus and moderate mucosal thickening left maxillary sinus.  Near complete opacification of the right sphenoid sinus with moderate mucosal thickening of the left sphenoid origins. ANTERIOR INFERIOR CEREBELLAR: Flow identified at origins. PICA: Right PICA is identified however its origin is not clearly seen. Eddye Cozier ANEURYSMS: None. VASCULAR MALFORMATIONS: None. OTHER: Negative. CONCLUSION:  1.   Normal left vertebral artery w cerebellar infarcts BRAINSTEM: Small chronic lacunar type infarct involving the dorsal midbrain left paramedian location at the level of the cerebral peduncles CSF SPACES: Trace intraventricular restricted diffusion in the left occipital horn and atria the ONLY) EM, 4/19/2018, 16:33. [facility], MRA BRAIN (FCJ=[id number]), 4/19/2018, 18:52. INDICATIONS: Right vertebral artery occlusion on MRA brain and nonvisualization on recent chest CT.   TECHNIQUE: Color duplex Doppler ultrasound and pulsed Dop small saphenous, and posterior tibial veins in both lower extremities have a sonographically unremarkable appearance. The right peroneal veins are unremarkable. The left peroneal veins are not well delineated.    GRAYSCALE: There are no visible echogenic th unremarkable and not dilated. Mediastinal fat planes are preserved. Cardiac chambers are unremarkable. Pericardium  is normal. There are coronary artery calcifications. The main pulmonary artery has a normal diameter and is otherwise unremarkable.   LUNGS A type IV hiatal hernia with severe volume loss in the left hemithorax. Bilateral lower lobe air space opacities suggestive of atelectasis.  Underlying pneumonia is felt to be less likely but is also in the differential.    Dictated by (CST): Samantha Tomas MD

## 2018-04-25 NOTE — PROGRESS NOTES
[facility] AND [facility] Infectious Disease  Progress Note    Garret Parra Patient Status:  Inpatient    4/10/1950 MRN [id number]   Location [facility] 3W/SW Attending Edilma Santana DO   Hosp Day # 6 PCP Farhad Conteh MD     Subjective:  Pa outpatient per pulmonary     3.  h/o ischemic CVAs  - KRIS earlier this hospital stay - \"valves clean\"     4.  Disposition - inpatient.  Continue IV ceftraixone as Rx.  All repeat cultures negative.  Anticipate ultimate transition to p.o.  Rx when ready fo

## 2018-04-25 NOTE — PLAN OF CARE
PAIN - ADULT    • Verbalizes/displays adequate comfort level or patient's stated pain goal Not Progressing    Continues to have headache/migraine. 10/10 pain.  norco and Iv dilaudid given and ice packs applied for comfort      NEUROLOGICAL - ADULT    • Achi

## 2018-04-25 NOTE — PHYSICAL THERAPY NOTE
PHYSICAL THERAPY TREATMENT NOTE - INPATIENT     Room Number: 337/337-A       Presenting Problem: Fever, cough, SOB, weakness, hypoxia - Sepsis;  Headache, dizziness    Problem List  Principal Problem:    Sepsis due to undetermined organism [facility])  Active Pro Static Sitting: Good  Dynamic Sitting: Good           Static Standing: Fair  Dynamic Standing: Fair -    ACTIVITY TOLERANCE  Room air    AM-PAC '6-Clicks' INPATIENT SHORT FORM - BASIC MOBILI #3   Current Status CGA 30ft total    Goal #4 Patient will negotiate 7 stairs/one curb w/ assistive device and supervision   Goal #4   Current Status Not tested    Goal #5 Patient to demonstrate independence with home activity/exercise instructions provide

## 2018-04-25 NOTE — PLAN OF CARE
Problem: Patient/Family Goals  Goal: Patient/Family Long Term Goal  Patient's Long Term Goal: Go home     Interventions:  - Monitor labs/vitals   - await results for MRI   - oncology consult   - manage pain   - See additional Care Plan goals for specific i goal  INTERVENTIONS:  - Encourage pt to monitor pain and request assistance  - Assess pain using appropriate pain scale  - Administer analgesics based on type and severity of pain and evaluate response  - Implement non-pharmacological measures as appropria maximal functionality and self care  INTERVENTIONS  - Monitor swallowing and airway patency with patient fatigue and changes in neurological status  - Encourage and assist patient to increase activity and self care with guidance from PT/OT  - Encourage vis

## 2018-04-25 NOTE — PROGRESS NOTES
Not too much  change clinically. Still having severe headaches. No significant nasal discharge. Ear pressure is minimizing. No other real changes planned from an ENT standpoint.   Continue antibiotics and we may need to consider some surgical interven

## 2018-04-25 NOTE — PROGRESS NOTES
Hematology/Oncology  Progress Note        NAME: Chidi Stout - ROOM: [facility]-L - MRN: [id number] - Age: 77year old - : 4/10/1950    Subjective:  No acute events overnight.  But with significant headaches     Medications:  • Potassium Chloride ER  40 mEq rashes or lesions.     Recent Labs   Lab  04/25/18   0538   RBC  3.83   HGB  12.2   HCT  36.4   MCV  94.9   MCH  31.9   MCHC  33.7   RDW  14.0   WBC  12.5*   PLT  367     Recent Labs   Lab  04/23/18   0508  04/24/18   0613  04/25/18   0538   GLU  98   --

## 2018-04-25 NOTE — PROGRESS NOTES
Neurology Inpatient Follow-up Note      HPI:     Patient seen in follow-up. Complains of headache.       Past Medical Hisotory:  Reviewed    Medications:  Reviewed    Allergies:    Codeine                 Itching      ROS:   GENERAL: no weight loss or feve

## 2018-04-26 PROCEDURE — 99232 SBSQ HOSP IP/OBS MODERATE 35: CPT | Performed by: OTHER

## 2018-04-26 RX ORDER — KETOROLAC TROMETHAMINE 30 MG/ML
30 INJECTION, SOLUTION INTRAMUSCULAR; INTRAVENOUS EVERY 6 HOURS PRN
Status: DISPENSED | OUTPATIENT
Start: 2018-04-26 | End: 2018-04-28

## 2018-04-26 RX ORDER — DIVALPROEX SODIUM 250 MG/1
250 TABLET, DELAYED RELEASE ORAL EVERY 8 HOURS SCHEDULED
Status: DISCONTINUED | OUTPATIENT
Start: 2018-04-26 | End: 2018-04-28

## 2018-04-26 NOTE — PLAN OF CARE
NEUROLOGICAL - ADULT    • Achieves stable or improved neurological status-alert,oriented x4,no neurological deficits Progressing    • Absence of seizures Progressing    •  Progressing    • Achieves maximal functionality and self care-ambulating with 1 assi

## 2018-04-26 NOTE — PROGRESS NOTES
Pulmonary Progress Note      NAME: Rafiq Stout - ROOM: 337/[facility]-A - MRN: [id number] - Age: 77year old - : 4/10/1950    Assessment/Plan:  1. Abnormal CT Chest - RUL 2.5cm medial nodule with associated mediastinal lymphadenopathy.  There is also an enlarg non-tender, non-distended, positive BS.    Extremity: no edema    Recent Labs   Lab  04/26/18   0607   RBC  3.82   HGB  12.2   HCT  36.0   MCV  94.4   MCH  31.8   MCHC  33.7   RDW  13.9   WBC  12.8*   PLT  412*     Recent Labs   Lab  04/24/18   0613  04/25/

## 2018-04-26 NOTE — PROGRESS NOTES
[facility] Hospitalist Progress Note     PCP: Rasheed Stevens MD    CC: Patient presents with:  Breathing Problem  Ear Problem     Assessment/Plan:       Quintin Stout is a 77year old female with PMH of HTN, hypothyroidism, who presents with fever, cough, clinical course. PT/OT evaluation today now that headache resolved and patient is more receptive to participating. Possible dc tomorrow on PO antibiotics    Questions/concerns were discussed with patient and/or family by bedside.     MD ARELY Camarena Flush, Oxymetazoline HCl, benzonatate, TraZODone HCl, ipratropium-albuterol, Normal Saline Flush, acetaminophen, ondansetron HCl, guaiFENesin    Data Review:       Labs:     Recent Labs   Lab  04/24/18   0613  04/25/18   0538  04/26/18   0607   WBC  12.9* microvascular ischemic disease. No acute hemorrhage or intracranial mass or mass effect. No midline shift. CSF SPACES: There is mild generalized parenchymal volume loss with concordant enlargement of the ventricular system. No hydrocephalus.   SINUSES: The stenosis. BASILAR: The basilar artery has decreased signal at its origin, but otherwise is well opacified distally. VERTEBRALS: Right vertebral artery is not identified. Left vertebral artery has ankle caliber to the basilar artery.  SUPERIOR CEREBELLAR: F Scattered foci of restricted diffusion in the subcortical regions posterior frontal-parietal regions considered to 'T2 shine through effect' from subacute areas of subcortical ischemia no corresponding low ADC signal.  CEREBELLUM: Small chronic punctate bi Approved by (CST): Latoya Caceres MD on 4/22/2018 at 8:50          Us Carotid Doppler Bilat - Diag Img (cpt=93880)    Result Date: 4/20/2018  PROCEDURE: US CAROTID DOPPLER BILAT-DIAG IMG (CPT=93880) COMPARISON: [facility], CT CHEST PARISA available. INDICATIONS: Bilateral lower leg swelling. TECHNIQUE: Color duplex Doppler venous ultrasound of both lower extremities was performed in the  usual manner. FINDINGS: The femoral and popliteal veins appear normal bilaterally.  Visualized portion lower paratracheal lymph node. There are other multiple mildly prominent mediastinal lymph nodes which are nonspecific and measure less than 1 cm in short axis. There is atherosclerotic calcification of the aortic arch and thoracic aorta.  The thoracic aort upper lobe mass with mediastinal lymphadenopathy. Findings are suspicious for primary lung neoplasm. Further evaluation with PET/CT and/or tissue sampling suggested.   1.5 x 1 cm right lower chest wall nodule may represent a reactive versus metastatic lymph

## 2018-04-26 NOTE — PROGRESS NOTES
[facility] AND [facility] Infectious Disease Progress Note    Joy Cortesby Patient Status:  Inpatient    4/10/1950 MRN [id number]   Location [facility] 3W/SW Attending Ivis Yap MD   Hosp Day # 7 PCP Shweta Snyder MD     Subjective distress. Vital Signs:  Blood pressure 132/84, pulse 84, temperature 98.6 °F (37 °C), temperature source Oral, resp. rate 16, height 5' 9\" (1.753 m), weight 227 lb (103 kg), SpO2 92 %. Temp (24hrs), Av.3 °F (36.8 °C), Min:97.9 °F (36.6 °C), Max:98.

## 2018-04-26 NOTE — PROGRESS NOTES
Hematology/Oncology  Progress Note        NAME: Cristel Cortesby - ROOM: [address] [place] [address] - MRN: [id number] - Age: 77year old - : 4/10/1950    Subjective:  No acute events overnight.    Mentation much improved  Headache also improved    Medications:  • divalproex edema   Skin: No rashes or lesions.     Recent Labs   Lab  04/26/18   0607   RBC  3.82   HGB  12.2   HCT  36.0   MCV  94.4   MCH  31.8   MCHC  33.7   RDW  13.9   WBC  12.8*   PLT  412*     Recent Labs   Lab  04/24/18   7050  04/25/18   0538  04/26/18   7030

## 2018-04-26 NOTE — PROGRESS NOTES
Neurology Inpatient Follow-up Note      HPI:     Patient seen in follow-up. She is feeling better after the Depakote, although headaches not completely resolved.       Past Medical Hisotory:  Reviewed    Medications:  Reviewed    Allergies:    Codeine any questions / concerns.     Eliezer Deleon MD  [address] [place] [address] [phone]

## 2018-04-26 NOTE — OCCUPATIONAL THERAPY NOTE
OCCUPATIONAL THERAPY TREATMENT NOTE - INPATIENT        Room Number: 337/337-A           Presenting Problem: Sepsis    Problem List  Principal Problem:    Sepsis due to undetermined organism [facility])  Active Problems:    Acute nonintractable headache, unspecif 38.32%  Standardized Score (AM-PAC Scale): 42.03  CMS Modifier (G-Code): CJ    FUNCTIONAL TRANSFER ASSESSMENT  Supine to Sit : Minimum assistance  Sit to Stand: Minimum assistance  Toilet Transfer: Min A  Shower Transfer: NA  Chair Transfer: Lelona Console

## 2018-04-26 NOTE — PHYSICAL THERAPY NOTE
PHYSICAL THERAPY TREATMENT NOTE - INPATIENT     Room Number: 337/337-A       Presenting Problem: Fever, cough, SOB, weakness, hypoxia - Sepsis;  Headache, dizziness    Problem List  Principal Problem:    Sepsis due to undetermined organism [facility])  Active Pro MOBILITY  How much difficulty does the patient currently have. ..  -   Turning over in bed (including adjusting bedclothes, sheets and blankets)?: A Little   -   Sitting down on and standing up from a chair with arms (e.g., wheelchair, bedside commode, etc. with home activity/exercise instructions provided to patient in preparation for discharge.    Goal #5   Current Status Not tested    Goal #6     Goal #6  Current Status

## 2018-04-27 PROCEDURE — 99232 SBSQ HOSP IP/OBS MODERATE 35: CPT | Performed by: OTHER

## 2018-04-27 RX ORDER — CEFDINIR 300 MG/1
300 CAPSULE ORAL 2 TIMES DAILY
Qty: 28 CAPSULE | Refills: 0 | Status: SHIPPED | OUTPATIENT
Start: 2018-04-27 | End: 2018-05-11

## 2018-04-27 NOTE — PROGRESS NOTES
[facility] Hospitalist Progress Note     PCP: Donavon Boxer, MD    CC: Patient presents with:  Breathing Problem  Ear Problem     Assessment/Plan:       Hali Stout is a 77year old female with PMH of HTN, hypothyroidism, who presents with fever, cough, SNF acceptance    Questions/concerns were discussed with patient and/or family by bedside. Verner Neighbors, MD  [facility] Hospitalist  Answering Service: [phone]        Subjective      Feels better today. HA is improved. Still light sensitive.        Ob 12.8*  9.5   HGB  12.2  12.2  11.9*   MCV  94.9  94.4  94.7   PLT  367  412*  416*       Recent Labs   Lab  04/25/18   0538  04/26/18   0607  04/27/18   0605   NA  136  138  137   K  3.7  4.1  4.1  3.9   CL  101  104  105   CO2  28  26  24   BUN  11  8  15 severe mucosal thickening of the ethmoid air cells. Severe mucosal thickening of the right maxillary sinus and moderate mucosal thickening left maxillary sinus.  Near complete opacification of the right sphenoid sinus with moderate mucosal thickening of the identified at origins. ANTERIOR INFERIOR CEREBELLAR: Flow identified at origins. PICA: Right PICA is identified however its origin is not clearly seen. Yuvonne Manifold ANEURYSMS: None. VASCULAR MALFORMATIONS: None. OTHER: Negative. CONCLUSION:  1.   Normal left vert bilateral cerebellar infarcts BRAINSTEM: Small chronic lacunar type infarct involving the dorsal midbrain left paramedian location at the level of the cerebral peduncles CSF SPACES: Trace intraventricular restricted diffusion in the left occipital horn and PAIN/PE (IV ONLY) EM, 4/19/2018, 16:33. [facility], MRA BRAIN (WOR=[id number]), 4/19/2018, 18:52. INDICATIONS: Right vertebral artery occlusion on MRA brain and nonvisualization on recent chest CT.   TECHNIQUE: Color duplex Doppler ultrasound an the great and small saphenous, and posterior tibial veins in both lower extremities have a sonographically unremarkable appearance. The right peroneal veins are unremarkable. The left peroneal veins are not well delineated.    GRAYSCALE: There are no visibl otherwise unremarkable and not dilated. Mediastinal fat planes are preserved. Cardiac chambers are unremarkable. Pericardium  is normal. There are coronary artery calcifications.  The main pulmonary artery has a normal diameter and is otherwise unremarkable node.  Large type IV hiatal hernia with severe volume loss in the left hemithorax. Bilateral lower lobe air space opacities suggestive of atelectasis.  Underlying pneumonia is felt to be less likely but is also in the differential.    Dictated by (CST): Ga

## 2018-04-27 NOTE — PROGRESS NOTES
[facility] AND [facility] Infectious Disease Progress Note    Jacqui Stout Patient Status:  Inpatient    4/10/1950 MRN [id number]   Location [facility] 3W/SW Attending Lorene Wooten MD   Hosp Day # 8 PCP Tate Mazariegos MD     Subjective pressure 126/74, pulse 74, temperature 98.6 °F (37 °C), temperature source Oral, resp. rate 20, height 5' 9\" (1.753 m), weight 226 lb (102.5 kg), SpO2 92 %.    Temp (24hrs), Av.3 °F (36.8 °C), Min:97.9 °F (36.6 °C), Max:98.6 °F (37 °C)      HEENT: Mild

## 2018-04-27 NOTE — PLAN OF CARE
Problem: RESPIRATORY - ADULT  Goal: Achieves optimal ventilation and oxygenation  INTERVENTIONS:  - Assess for changes in respiratory status  - Assess for changes in mentation and behavior  - Position to facilitate oxygenation and minimize respiratory effo appropriate  - Consider OT/PT consult to assist with strengthening/mobility  - Encourage toileting schedule   Outcome: Progressing      Problem: NEUROLOGICAL - ADULT  Goal: Achieves stable or improved neurological status  INTERVENTIONS  - Assess for and re Skin remains intact

## 2018-04-27 NOTE — PROGRESS NOTES
Neurology Inpatient Follow-up Note      HPI:     Patient seen in follow-up. Headache improved. No photophobia.       Past Medical Hisotory:  Reviewed    Medications:  Reviewed    Allergies:    Codeine                 Itching      ROS:   GENERAL: no weight [address] [facility]  [phone]

## 2018-04-27 NOTE — OCCUPATIONAL THERAPY NOTE
OCCUPATIONAL THERAPY TREATMENT NOTE - INPATIENT        Room Number: 337/337-A           Presenting Problem: Sepsis    Problem List  Principal Problem:    Sepsis due to undetermined organism [facility])  Active Problems:    Acute nonintractable headache, unspecif Scale): 42.03  CMS Modifier (G-Code): CJ    FUNCTIONAL TRANSFER ASSESSMENT  Supine to Sit : Minimum assistance  Sit to Stand: Minimum assistance  Toilet Transfer: Minimal assist  Shower Transfer: NT  Chair Transfer: Minimal assist     Bedroom Mobility: Amb

## 2018-04-27 NOTE — PLAN OF CARE
Problem: Patient/Family Goals  Goal: Patient/Family Long Term Goal  Patient's Long Term Goal: tolerate oral abx to go to rehab  Interventions:  - Monitor labs/vitals   - PT/OT  - manage pain   - referrals to rehab, SW consult  - Medication education and ma or any respiratory difficulty  - Respiratory Therapy support as indicated  - Manage/alleviate anxiety  - Monitor for signs/symptoms of CO2 retention    Outcome: Progressing      Problem: PAIN - ADULT  Goal: Verbalizes/displays adequate comfort level or pat appropriate interventions as ordered   Outcome: Progressing    Goal: Absence of seizures  INTERVENTIONS  - Monitor for seizure activity  - Monitor neurological status    Outcome: Progressing    Goal: Remains free of injury related to seizure activity  INTE discharge planning if the patient needs post-hospital services based on physician/LIP order or complex needs related to functional status, cognitive ability or social support system   Outcome: Not Progressing

## 2018-04-28 VITALS
OXYGEN SATURATION: 94 % | SYSTOLIC BLOOD PRESSURE: 142 MMHG | TEMPERATURE: 99 F | HEIGHT: 69 IN | WEIGHT: 227.81 LBS | DIASTOLIC BLOOD PRESSURE: 75 MMHG | HEART RATE: 70 BPM | BODY MASS INDEX: 33.74 KG/M2 | RESPIRATION RATE: 16 BRPM

## 2018-04-28 PROCEDURE — 99233 SBSQ HOSP IP/OBS HIGH 50: CPT | Performed by: OTHER

## 2018-04-28 RX ORDER — DIVALPROEX SODIUM 250 MG/1
250 TABLET, DELAYED RELEASE ORAL EVERY 8 HOURS SCHEDULED
Qty: 90 TABLET | Refills: 0 | Status: SHIPPED | OUTPATIENT
Start: 2018-04-28 | End: 2018-06-25

## 2018-04-28 RX ORDER — ECHINACEA PURPUREA EXTRACT 125 MG
2 TABLET ORAL
Qty: 1 BOTTLE | Refills: 0 | Status: SHIPPED | OUTPATIENT
Start: 2018-04-28 | End: 2018-05-10

## 2018-04-28 RX ORDER — 0.9 % SODIUM CHLORIDE 0.9 %
VIAL (ML) INJECTION
Status: COMPLETED
Start: 2018-04-28 | End: 2018-04-28

## 2018-04-28 RX ORDER — GUAIFENESIN 100 MG/5ML
100 SOLUTION ORAL EVERY 6 HOURS PRN
Qty: 1 BOTTLE | Refills: 0 | Status: SHIPPED | OUTPATIENT
Start: 2018-04-28 | End: 2018-05-10

## 2018-04-28 NOTE — PROGRESS NOTES
[facility] AND [facility] Infectious Disease Progress Note    Jorge Carlin Patient Status:  Inpatient    4/10/1950 MRN [id number]   Location [facility] 3W/SW Attending Anne Brandon MD   Hosp Day # 9 PCP Yanira Maki MD     Subjective pressure 144/79, pulse 70, temperature 98.6 °F (37 °C), temperature source Oral, resp. rate 16, height 5' 9\" (1.753 m), weight 227 lb 12.8 oz (103.3 kg), SpO2 93 %.    Temp (24hrs), Av.3 °F (36.8 °C), Min:97.9 °F (36.6 °C), Max:98.6 °F (37 °C)      CLAUDE

## 2018-04-28 NOTE — PHYSICAL THERAPY NOTE
PHYSICAL THERAPY TREATMENT NOTE - INPATIENT     Room Number: 337/337-A       Presenting Problem: Fever, cough, SOB, weakness, hypoxia - Sepsis;  Headache, dizziness    Problem List  Principal Problem:    Sepsis due to undetermined organism [facility])  Active Pro MOBILITY  How much difficulty does the patient currently have. ..  -   Turning over in bed (including adjusting bedclothes, sheets and blankets)?: None   -   Sitting down on and standing up from a chair with arms (e.g., wheelchair, bedside commode, etc.): N w/ assistive device and supervision   Goal #4   Current Status Pt demo 5 stairs with 1 rail and straight cane and sba. Goal #5 Patient to demonstrate independence with home activity/exercise instructions provided to patient in preparation for discharge.

## 2018-04-28 NOTE — PROGRESS NOTES
[facility] Hospitalist Progress Note     PCP: Fabian Lin MD    CC: Patient presents with:  Breathing Problem  Ear Problem     Assessment/Plan:       Pk Stout is a 77year old female with PMH of HTN, hypothyroidism, who presents with fever, cough, IVF: none  - Diet: General    Dispo: home with [facility] if able to do stairs with PT; possibly today    Questions/concerns were discussed with patient and/or family by bedside.     Francisca Poole MD  [facility] Hospitalist  Answering Service: Jeanne Archer [email or web address]    Data Review:       Labs:     Recent Labs   Lab  04/26/18   0607  04/27/18   0605  04/28/18   0624   WBC  12.8*  9.5  8.3   HGB  12.2  11.9*  12.0   MCV  94.4  94.7  95.6   PLT  412*  416*  386       Recent Labs   Lab  04/26/18   0607  04/27/ generalized parenchymal volume loss with concordant enlargement of the ventricular system. No hydrocephalus. SINUSES: There is severe mucosal thickening of the ethmoid air cells.  Severe mucosal thickening of the right maxillary sinus and moderate mucosal vertebral artery is not identified. Left vertebral artery has ankle caliber to the basilar artery. SUPERIOR CEREBELLAR: Flow identified at origins. ANTERIOR INFERIOR CEREBELLAR: Flow identified at origins.  PICA: Right PICA is identified however its origin effect' from subacute areas of subcortical ischemia no corresponding low ADC signal.  CEREBELLUM: Small chronic punctate bilateral cerebellar infarcts BRAINSTEM: Small chronic lacunar type infarct involving the dorsal midbrain left paramedian location at t 4/20/2018  PROCEDURE: US CAROTID DOPPLER BILAT-DIAG IMG (CPT=93880) COMPARISON: [facility], CT CHEST PAIN/PE (IV ONLY) EM, 4/19/2018, 16:33. [facility], MRA BRAIN (JYJ=[id number]), 4/19/2018, 18:52.  INDICATIONS: Right vertebral in the  usual manner. FINDINGS: The femoral and popliteal veins appear normal bilaterally. Visualized portions of the great and small saphenous, and posterior tibial veins in both lower extremities have a sonographically unremarkable appearance.  The right short axis. There is atherosclerotic calcification of the aortic arch and thoracic aorta. The thoracic aorta is otherwise unremarkable and not dilated. Mediastinal fat planes are preserved. Cardiac chambers are unremarkable.  Pericardium  is normal. There a sampling suggested. 1.5 x 1 cm right lower chest wall nodule may represent a reactive versus metastatic lymph node. Large type IV hiatal hernia with severe volume loss in the left hemithorax.   Bilateral lower lobe air space opacities suggestive of atelec

## 2018-04-28 NOTE — FACE TO FACE NOTE
[facility]  Face to Face Encounter Note    Dubois Downy Patient Status:  Inpatient    4/10/1950 MRN [id number]   Location [facility] 3W/SW Attending Jb Tello MD   Hosp Day # 9 PCP Claudean Palmer, MD       I, or a non-physici with the patient's community-based physician who will be assuming this patient's home health plan of care.     Date:  4/28/18  Physician:  Shanelle Morales MD

## 2018-04-28 NOTE — PLAN OF CARE
Problem: Patient/Family Goals  Goal: Patient/Family Long Term Goal  Patient's Long Term Goal: tolerate oral abx to go to rehab  Interventions:  - Monitor labs/vitals   - PT/OT  - manage pain   - referrals to rehab, SW consult  - Medication education and ma Respiratory Therapy support as indicated  - Manage/alleviate anxiety  - Monitor for signs/symptoms of CO2 retention    Outcome: Progressing      Problem: PAIN - ADULT  Goal: Verbalizes/displays adequate comfort level or patient's stated pain goal  INTERVEN Outcome: Progressing    Goal: Absence of seizures  INTERVENTIONS  - Monitor for seizure activity  - Monitor neurological status    Outcome: Progressing    Goal: Remains free of injury related to seizure activity  INTERVENTIONS:  - Maintain airway, patien Assess patient's ability to be responsible for managing their own health  - Refer to Case Management Department for coordinating discharge planning if the patient needs post-hospital services based on physician/LIP order or complex needs related to functio

## 2018-04-28 NOTE — CM/SW NOTE
MD order received regarding HHC. CHRIS spoke with the pt's  who stated if the pt. Passes PT the plan is for the pt. To discharge home with Jr Elmore. The pt.  And her  will be staying at the pt's father's home as it is one level with 4 steps to enter

## 2018-04-28 NOTE — PROGRESS NOTES
[facility]    Progress Note    187 Rohit Jimenez Patient Status:  Inpatient    4/10/1950 MRN [id number]   Location [facility] 3W/SW Attending Deette Sacks, MD   [facility] Day # 9 PCP Ramon Hdez MD       Subjective:   Ulysses Justice 3 mL Nebulization Q4H PRN   atorvastatin (LIPITOR) tab 40 mg 40 mg Oral Nightly   Levothyroxine Sodium (SYNTHROID, LEVOTHROID) tab 125 mcg 125 mcg Oral Daily   Normal Saline Flush 0.9 % injection 3 mL 3 mL Intravenous PRN   acetaminophen (TYLENOL) tab 650

## 2018-04-28 NOTE — PLAN OF CARE
discharge instructions given to pt, and regla. rx faxed and picked up already, NIH completed.  Discharged to home with home health

## 2018-04-29 NOTE — DISCHARGE SUMMARY
General Medicine Discharge Summary     Patient ID:  Marcelo Atkinson [id number]  53 year old  4/10/1950    Admit date: 4/19/2018    Discharge date and time: 04/28/18    Attending Physician: Ivanna Haas MD    Primary Care Physician: MD Ayesha Cruz ID/ENT, risk for meningitis--on ceftriaxone  -surveillance bcx ntd, f/u final cx's--> also negative to date     Abnormal MRI and MRA brain  - possible R vertebral occlusion, neg on carotid u/s.    -small subacute multifocal infarcts and intraventricular he hours as needed for congestion. Saline Nasal Spray 0.65 % Soln  Commonly known as:  SALINE MIST  2 sprays by Nasal route 3 (three) times daily.         CONTINUE taking these medications    ASPIR-81 OR     atorvastatin 40 MG Tabs  Commonly known as:  [facility]IP [facility] [address] [place] [address] Degree., Joshua Nicholson MD In 2 weeks.     Specialties:  Internal Medicine, PEDIATRICS  Contact information:  [address]  [address]I [address]             Tate An MD I

## 2018-05-10 PROBLEM — A41.9 SEPSIS DUE TO UNDETERMINED ORGANISM (HCC): Status: RESOLVED | Noted: 2018-04-19 | Resolved: 2018-05-10

## 2018-05-10 PROBLEM — A41.3: Status: ACTIVE | Noted: 2018-05-10

## 2018-05-16 PROBLEM — Q21.1 PATENT FORAMEN OVALE WITH RIGHT TO LEFT SHUNT: Status: ACTIVE | Noted: 2018-05-16

## 2018-05-16 PROBLEM — Q21.12 PATENT FORAMEN OVALE WITH RIGHT TO LEFT SHUNT: Status: ACTIVE | Noted: 2018-05-16

## 2018-05-16 PROBLEM — Q21.12: Status: ACTIVE | Noted: 2018-05-16

## 2018-05-18 PROBLEM — R93.89 ABNORMAL CT OF THE CHEST: Status: ACTIVE | Noted: 2018-05-18

## 2018-06-04 PROBLEM — I70.0 THORACIC AORTA ATHEROSCLEROSIS (HCC): Status: ACTIVE | Noted: 2018-06-04

## 2018-06-04 PROBLEM — I77.9 CAROTID DISEASE, BILATERAL (HCC): Status: ACTIVE | Noted: 2018-06-04

## 2018-06-11 PROBLEM — R51.9 ACUTE NONINTRACTABLE HEADACHE, UNSPECIFIED HEADACHE TYPE: Status: RESOLVED | Noted: 2018-04-19 | Resolved: 2018-06-11

## 2018-10-08 PROBLEM — A41.3: Status: RESOLVED | Noted: 2018-05-10 | Resolved: 2018-10-08

## 2019-08-14 PROBLEM — E66.01 OBESITY, MORBID, BMI 40.0-49.9 (HCC): Status: ACTIVE | Noted: 2019-08-14

## 2019-08-20 PROCEDURE — 82365 CALCULUS SPECTROSCOPY: CPT | Performed by: UROLOGY

## 2019-09-04 PROBLEM — E66.01 OBESITY, MORBID, BMI 40.0-49.9 (HCC): Status: RESOLVED | Noted: 2019-08-14 | Resolved: 2019-09-04

## 2019-10-02 PROCEDURE — 87086 URINE CULTURE/COLONY COUNT: CPT | Performed by: UROLOGY

## 2019-10-28 PROBLEM — M87.00 AVASCULAR NECROSIS (HCC): Status: ACTIVE | Noted: 2019-10-28

## 2019-10-28 PROBLEM — M25.512 CHRONIC PAIN OF BOTH SHOULDERS: Status: ACTIVE | Noted: 2019-10-28

## 2019-10-28 PROBLEM — M25.511 CHRONIC PAIN OF BOTH SHOULDERS: Status: ACTIVE | Noted: 2019-10-28

## 2019-10-28 PROBLEM — G89.29 CHRONIC PAIN OF BOTH SHOULDERS: Status: ACTIVE | Noted: 2019-10-28

## 2019-11-07 PROBLEM — N20.0 RECURRENT KIDNEY STONES: Status: ACTIVE | Noted: 2019-11-07

## 2019-11-07 PROBLEM — N39.0 RECURRENT UTI: Status: ACTIVE | Noted: 2019-11-07

## 2019-11-07 PROBLEM — N20.0 KIDNEY STONE ON LEFT SIDE: Status: ACTIVE | Noted: 2019-11-07

## 2019-11-21 PROBLEM — G31.9 CEREBELLAR ATROPHY (HCC): Status: ACTIVE | Noted: 2019-11-21

## 2019-11-22 PROBLEM — M87.00 AVASCULAR NECROSIS (HCC): Status: RESOLVED | Noted: 2019-10-28 | Resolved: 2019-11-22

## 2019-11-22 PROBLEM — R93.89 ABNORMAL CT OF THE CHEST: Status: RESOLVED | Noted: 2018-05-18 | Resolved: 2019-11-22

## 2019-11-22 PROBLEM — N39.0 RECURRENT UTI: Status: RESOLVED | Noted: 2019-11-07 | Resolved: 2019-11-22

## 2019-11-22 PROBLEM — M25.511 CHRONIC PAIN OF BOTH SHOULDERS: Status: RESOLVED | Noted: 2019-10-28 | Resolved: 2019-11-22

## 2019-11-22 PROBLEM — G89.29 CHRONIC PAIN OF BOTH SHOULDERS: Status: RESOLVED | Noted: 2019-10-28 | Resolved: 2019-11-22

## 2019-11-22 PROBLEM — N20.0 KIDNEY STONE ON LEFT SIDE: Status: RESOLVED | Noted: 2019-11-07 | Resolved: 2019-11-22

## 2019-11-22 PROBLEM — M25.512 CHRONIC PAIN OF BOTH SHOULDERS: Status: RESOLVED | Noted: 2019-10-28 | Resolved: 2019-11-22

## 2020-01-29 PROBLEM — M19.011 ARTHRITIS OF BOTH GLENOHUMERAL JOINTS: Status: ACTIVE | Noted: 2020-01-29

## 2020-01-29 PROBLEM — M19.012 ARTHRITIS OF BOTH GLENOHUMERAL JOINTS: Status: ACTIVE | Noted: 2020-01-29

## 2020-03-30 ENCOUNTER — HOSPITAL ENCOUNTER (EMERGENCY)
Facility: HOSPITAL | Age: 70
Discharge: HOME | End: 2020-03-30
Attending: EMERGENCY MEDICINE
Payer: MEDICARE

## 2020-03-30 VITALS
WEIGHT: 200 LBS | DIASTOLIC BLOOD PRESSURE: 66 MMHG | TEMPERATURE: 100 F | RESPIRATION RATE: 22 BRPM | OXYGEN SATURATION: 96 % | SYSTOLIC BLOOD PRESSURE: 109 MMHG | HEART RATE: 79 BPM | HEIGHT: 65 IN | BODY MASS INDEX: 33.32 KG/M2

## 2020-03-30 DIAGNOSIS — B34.9 VIRAL SYNDROME: Primary | ICD-10-CM

## 2020-03-30 PROCEDURE — 93005 ELECTROCARDIOGRAM TRACING: CPT

## 2020-03-30 PROCEDURE — 99283 EMERGENCY DEPT VISIT LOW MDM: CPT

## 2020-03-30 PROCEDURE — 93010 ELECTROCARDIOGRAM REPORT: CPT | Performed by: INTERNAL MEDICINE

## 2020-03-30 RX ORDER — ACETAMINOPHEN 500 MG
1000 TABLET ORAL ONCE
Status: COMPLETED | OUTPATIENT
Start: 2020-03-30 | End: 2020-03-30

## 2020-03-30 NOTE — ED PROVIDER NOTES
Patient Seen in: [facility] AND [facility] Emergency Department      History   Patient presents with:  Dyspnea GURINDER SOB    Stated Complaint: COUGH    HPI    42-year-old female with history of hypertension, hyperlipidemia, thyroid disease, here with complaints of LITHOTRIPSY LASER WITH CYSTOSCOPY Left 10/29/2019    Performed by Rosa Arriola MD at [facility]   • HOLMIUM  LITHOTRIPSY LASER WITH CYSTOSCOPY Bilateral 8/20/2019    Performed by Andi Pinto MD at [address] [place] [address]   • OTHER 24   Ht 165.1 cm (5' 5\")   Wt 90.7 kg   SpO2 96%   BMI 33.28 kg/m²         Physical Exam  Vitals signs and nursing note reviewed. Constitutional:       General: She is not in acute distress. Appearance: She is well-developed. She is not diaphoretic. hour(s)). Imaging Results Available and Reviewed by me while in ED:          EMERGENCY DEPARTMENT COURSE AND TREATMENT:  Patient's condition was stable during Emergency Department evaluation.      69yoF with fever, cough, myalgias  - I personally reviewe three months to obtain basic health screening including reassessment of your blood pressure.     Medications Prescribed:  Current Discharge Medication List

## 2020-03-31 LAB — SARS-COV-2 RNA RESP QL NAA+PROBE: NOT DETECTED

## 2020-07-22 ENCOUNTER — APPOINTMENT (OUTPATIENT)
Dept: CT IMAGING | Facility: HOSPITAL | Age: 70
End: 2020-07-22
Attending: EMERGENCY MEDICINE
Payer: MEDICARE

## 2020-07-22 ENCOUNTER — HOSPITAL ENCOUNTER (EMERGENCY)
Facility: HOSPITAL | Age: 70
Discharge: HOME | End: 2020-07-22
Attending: EMERGENCY MEDICINE
Payer: MEDICARE

## 2020-07-22 VITALS
HEIGHT: 65 IN | SYSTOLIC BLOOD PRESSURE: 141 MMHG | BODY MASS INDEX: 37.49 KG/M2 | HEART RATE: 46 BPM | RESPIRATION RATE: 16 BRPM | DIASTOLIC BLOOD PRESSURE: 89 MMHG | OXYGEN SATURATION: 96 % | WEIGHT: 225 LBS | TEMPERATURE: 99 F

## 2020-07-22 DIAGNOSIS — H53.2 DIPLOPIA: Primary | ICD-10-CM

## 2020-07-22 PROCEDURE — 70480 CT ORBIT/EAR/FOSSA W/O DYE: CPT | Performed by: EMERGENCY MEDICINE

## 2020-07-22 PROCEDURE — 99284 EMERGENCY DEPT VISIT MOD MDM: CPT

## 2020-07-22 PROCEDURE — 70450 CT HEAD/BRAIN W/O DYE: CPT | Performed by: EMERGENCY MEDICINE

## 2020-07-23 NOTE — ED PROVIDER NOTES
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 Cardiovascular:      Rate and Rhythm: Normal rate and regular rhythm.       Pulses: Normal p screening including reassessment of your blood pressure.       Medications Prescribed:  Current Discharge Medication List

## 2020-07-23 NOTE — ED NOTES
Assumed care of patient from triage. Patient to ED form home for left eye blurred vision. Patient states that she woke up this morning and had blurred vision. Patient describes vision as being \"jillian crossed\" and \"hard to focus. \" Patient states that sh

## 2020-07-23 NOTE — ED NOTES
Dr. Cecille Rodas at bedside shortly after pt arrived to room. Per ERMD, no need to call stroke alert.

## 2020-07-23 NOTE — ED INITIAL ASSESSMENT (HPI)
Pt c/o left eye \"blurriness\" since she woke up this AMa t 0800. Pt has hx of CVA, and states that she had the same sx when she had the cva. Pt is aox4, speech is clear. Strength and sensation are equal bl to all extremities.

## 2020-09-08 PROBLEM — M19.011 ARTHRITIS OF BOTH GLENOHUMERAL JOINTS: Status: RESOLVED | Noted: 2020-01-29 | Resolved: 2020-09-08

## 2020-09-08 PROBLEM — M19.012 ARTHRITIS OF BOTH GLENOHUMERAL JOINTS: Status: RESOLVED | Noted: 2020-01-29 | Resolved: 2020-09-08

## 2021-01-28 PROBLEM — I65.23 ATHEROSCLEROSIS OF BOTH CAROTID ARTERIES: Status: ACTIVE | Noted: 2018-06-04

## 2021-02-11 PROBLEM — I77.9 BILATERAL CAROTID ARTERY DISEASE, UNSPECIFIED TYPE (HCC): Status: ACTIVE | Noted: 2018-06-04

## 2021-06-21 NOTE — PLAN OF CARE
"-- DO NOT REPLY / DO NOT REPLY ALL --  -- Message is from the [employer]--    COVID-19 Universal Screening: N/A - Not about scheduling    General Patient Message      Reason for Call: patient is calling because she would like to come pick her tb results from her labs. She would like a call when its ready if she do not answer you can leave a messgage     Caller Information       Type Contact Phone    06/21/2021 11:02 AM CDT Phone (Incoming) Yuedebbie Roni (Self) [phone] (M)          Alternative phone number: none    Turnaround time given to caller: ""This message will be sent to [facility] Provider's name]. The clinical team will fulfill your request as soon as they review your message. \""    " Problem: Patient/Family Goals  Goal: Patient/Family Long Term Goal  Patient's Long Term Goal: Go home     Interventions:  - Monitor labs/vitals   - await results for MRI   - oncology consult   - manage pain   - See additional Care Plan goals for specific i

## 2022-02-01 PROBLEM — I65.23 BILATERAL CAROTID ARTERY OCCLUSION: Status: ACTIVE | Noted: 2018-06-04

## 2022-02-13 PROBLEM — E53.8 VITAMIN B 12 DEFICIENCY: Status: ACTIVE | Noted: 2022-02-13

## 2022-02-13 PROBLEM — E55.9 VITAMIN D DEFICIENCY: Status: ACTIVE | Noted: 2022-02-13

## 2022-02-13 PROBLEM — I77.9 BILATERAL CAROTID ARTERY DISEASE, UNSPECIFIED TYPE (HCC): Status: ACTIVE | Noted: 2022-02-13

## 2022-02-13 PROBLEM — R19.5 POSITIVE COLORECTAL CANCER SCREENING USING COLOGUARD TEST: Status: ACTIVE | Noted: 2022-02-13

## 2023-09-19 ENCOUNTER — ANESTHESIA (OUTPATIENT)
Dept: SURGERY | Facility: HOSPITAL | Age: 73
End: 2023-09-19
Payer: MEDICARE

## 2023-09-19 ENCOUNTER — APPOINTMENT (OUTPATIENT)
Dept: CT IMAGING | Facility: HOSPITAL | Age: 73
End: 2023-09-19
Attending: EMERGENCY MEDICINE
Payer: MEDICARE

## 2023-09-19 ENCOUNTER — HOSPITAL ENCOUNTER (OUTPATIENT)
Facility: HOSPITAL | Age: 73
Setting detail: OBSERVATION
Discharge: HOME | End: 2023-09-20
Attending: EMERGENCY MEDICINE | Admitting: HOSPITALIST
Payer: MEDICARE

## 2023-09-19 ENCOUNTER — ANESTHESIA EVENT (OUTPATIENT)
Dept: SURGERY | Facility: HOSPITAL | Age: 73
End: 2023-09-19
Payer: MEDICARE

## 2023-09-19 ENCOUNTER — APPOINTMENT (OUTPATIENT)
Dept: GENERAL RADIOLOGY | Facility: HOSPITAL | Age: 73
End: 2023-09-19
Attending: UROLOGY
Payer: MEDICARE

## 2023-09-19 DIAGNOSIS — N20.0 KIDNEY STONE: Primary | ICD-10-CM

## 2023-09-19 LAB
ANION GAP SERPL CALC-SCNC: 9 MMOL/L (ref 0–18)
BASOPHILS # BLD AUTO: 0.04 X10(3) UL (ref 0–0.2)
BASOPHILS NFR BLD AUTO: 0.4 %
BILIRUB UR QL: NEGATIVE
BUN BLD-MCNC: 16 MG/DL (ref 7–18)
BUN/CREAT SERPL: 18.8 (ref 10–20)
CALCIUM BLD-MCNC: 9.5 MG/DL (ref 8.5–10.1)
CHLORIDE SERPL-SCNC: 109 MMOL/L (ref 98–112)
CLARITY UR: CLEAR
CO2 SERPL-SCNC: 25 MMOL/L (ref 21–32)
COLOR UR: YELLOW
CREAT BLD-MCNC: 0.85 MG/DL
DEPRECATED RDW RBC AUTO: 48.7 FL (ref 35.1–46.3)
EGFRCR SERPLBLD CKD-EPI 2021: 71 ML/MIN/1.73M2 (ref 60–?)
EOSINOPHIL # BLD AUTO: 0.01 X10(3) UL (ref 0–0.7)
EOSINOPHIL NFR BLD AUTO: 0.1 %
ERYTHROCYTE [DISTWIDTH] IN BLOOD BY AUTOMATED COUNT: 13.7 % (ref 11–15)
GLUCOSE BLD-MCNC: 143 MG/DL (ref 70–99)
GLUCOSE UR-MCNC: NORMAL MG/DL
HCT VFR BLD AUTO: 44.9 %
HGB BLD-MCNC: 15.2 G/DL
IMM GRANULOCYTES # BLD AUTO: 0.03 X10(3) UL (ref 0–1)
IMM GRANULOCYTES NFR BLD: 0.3 %
KETONES UR-MCNC: NEGATIVE MG/DL
LEUKOCYTE ESTERASE UR QL STRIP.AUTO: NEGATIVE
LYMPHOCYTES # BLD AUTO: 1.24 X10(3) UL (ref 1–4)
LYMPHOCYTES NFR BLD AUTO: 11.9 %
MCH RBC QN AUTO: 32.3 PG (ref 26–34)
MCHC RBC AUTO-ENTMCNC: 33.9 G/DL (ref 31–37)
MCV RBC AUTO: 95.5 FL
MONOCYTES # BLD AUTO: 0.47 X10(3) UL (ref 0.1–1)
MONOCYTES NFR BLD AUTO: 4.5 %
NEUTROPHILS # BLD AUTO: 8.65 X10 (3) UL (ref 1.5–7.7)
NEUTROPHILS # BLD AUTO: 8.65 X10(3) UL (ref 1.5–7.7)
NEUTROPHILS NFR BLD AUTO: 82.8 %
NITRITE UR QL STRIP.AUTO: NEGATIVE
OSMOLALITY SERPL CALC.SUM OF ELEC: 300 MOSM/KG (ref 275–295)
PH UR: 5.5 [PH] (ref 5–8)
PLATELET # BLD AUTO: 251 10(3)UL (ref 150–450)
POTASSIUM SERPL-SCNC: 3.7 MMOL/L (ref 3.5–5.1)
PROT UR-MCNC: 20 MG/DL
RBC # BLD AUTO: 4.7 X10(6)UL
RBC #/AREA URNS AUTO: >10 /HPF
SODIUM SERPL-SCNC: 143 MMOL/L (ref 136–145)
SP GR UR STRIP: 1.02 (ref 1–1.03)
UROBILINOGEN UR STRIP-ACNC: NORMAL
WBC # BLD AUTO: 10.4 X10(3) UL (ref 4–11)

## 2023-09-19 PROCEDURE — 85025 COMPLETE CBC W/AUTO DIFF WBC: CPT | Performed by: EMERGENCY MEDICINE

## 2023-09-19 PROCEDURE — 81001 URINALYSIS AUTO W/SCOPE: CPT | Performed by: EMERGENCY MEDICINE

## 2023-09-19 PROCEDURE — 80048 BASIC METABOLIC PNL TOTAL CA: CPT | Performed by: EMERGENCY MEDICINE

## 2023-09-19 PROCEDURE — BT14ZZZ FLUOROSCOPY OF KIDNEYS, URETERS AND BLADDER: ICD-10-PCS | Performed by: UROLOGY

## 2023-09-19 PROCEDURE — 74176 CT ABD & PELVIS W/O CONTRAST: CPT | Performed by: EMERGENCY MEDICINE

## 2023-09-19 PROCEDURE — S0028 INJECTION, FAMOTIDINE, 20 MG: HCPCS

## 2023-09-19 PROCEDURE — 0T768DZ DILATION OF RIGHT URETER WITH INTRALUMINAL DEVICE, VIA NATURAL OR ARTIFICIAL OPENING ENDOSCOPIC: ICD-10-PCS | Performed by: UROLOGY

## 2023-09-19 DEVICE — URETERAL STENT
Type: IMPLANTABLE DEVICE | Site: URETER | Status: FUNCTIONAL
Brand: ASCERTA™

## 2023-09-19 RX ORDER — FAMOTIDINE 10 MG/ML
INJECTION, SOLUTION INTRAVENOUS AS NEEDED
Status: DISCONTINUED | OUTPATIENT
Start: 2023-09-19 | End: 2023-09-19 | Stop reason: SURG

## 2023-09-19 RX ORDER — ENEMA 19; 7 G/133ML; G/133ML
1 ENEMA RECTAL ONCE AS NEEDED
Status: DISCONTINUED | OUTPATIENT
Start: 2023-09-19 | End: 2023-09-20

## 2023-09-19 RX ORDER — SODIUM CHLORIDE, SODIUM LACTATE, POTASSIUM CHLORIDE, CALCIUM CHLORIDE 600; 310; 30; 20 MG/100ML; MG/100ML; MG/100ML; MG/100ML
INJECTION, SOLUTION INTRAVENOUS CONTINUOUS PRN
Status: DISCONTINUED | OUTPATIENT
Start: 2023-09-19 | End: 2023-09-19 | Stop reason: SURG

## 2023-09-19 RX ORDER — MORPHINE SULFATE 4 MG/ML
4 INJECTION, SOLUTION INTRAMUSCULAR; INTRAVENOUS EVERY 2 HOUR PRN
Status: DISCONTINUED | OUTPATIENT
Start: 2023-09-19 | End: 2023-09-20

## 2023-09-19 RX ORDER — ACETAMINOPHEN 500 MG
500 TABLET ORAL EVERY 4 HOURS PRN
Status: DISCONTINUED | OUTPATIENT
Start: 2023-09-19 | End: 2023-09-20

## 2023-09-19 RX ORDER — ACETAMINOPHEN 10 MG/ML
INJECTION, SOLUTION INTRAVENOUS AS NEEDED
Status: DISCONTINUED | OUTPATIENT
Start: 2023-09-19 | End: 2023-09-19 | Stop reason: SURG

## 2023-09-19 RX ORDER — MORPHINE SULFATE 4 MG/ML
4 INJECTION, SOLUTION INTRAMUSCULAR; INTRAVENOUS EVERY 10 MIN PRN
Status: DISCONTINUED | OUTPATIENT
Start: 2023-09-19 | End: 2023-09-19 | Stop reason: HOSPADM

## 2023-09-19 RX ORDER — HYDROMORPHONE HYDROCHLORIDE 1 MG/ML
0.2 INJECTION, SOLUTION INTRAMUSCULAR; INTRAVENOUS; SUBCUTANEOUS EVERY 5 MIN PRN
Status: DISCONTINUED | OUTPATIENT
Start: 2023-09-19 | End: 2023-09-19 | Stop reason: HOSPADM

## 2023-09-19 RX ORDER — METOCLOPRAMIDE HYDROCHLORIDE 5 MG/ML
10 INJECTION INTRAMUSCULAR; INTRAVENOUS EVERY 8 HOURS PRN
Status: DISCONTINUED | OUTPATIENT
Start: 2023-09-19 | End: 2023-09-19 | Stop reason: HOSPADM

## 2023-09-19 RX ORDER — ONDANSETRON 2 MG/ML
4 INJECTION INTRAMUSCULAR; INTRAVENOUS EVERY 6 HOURS PRN
Status: DISCONTINUED | OUTPATIENT
Start: 2023-09-19 | End: 2023-09-19 | Stop reason: HOSPADM

## 2023-09-19 RX ORDER — CEFAZOLIN SODIUM/WATER 2 G/20 ML
2 SYRINGE (ML) INTRAVENOUS EVERY 8 HOURS
Qty: 60 ML | Refills: 0 | Status: DISCONTINUED | OUTPATIENT
Start: 2023-09-20 | End: 2023-09-20

## 2023-09-19 RX ORDER — LIDOCAINE HYDROCHLORIDE 10 MG/ML
INJECTION, SOLUTION EPIDURAL; INFILTRATION; INTRACAUDAL; PERINEURAL AS NEEDED
Status: DISCONTINUED | OUTPATIENT
Start: 2023-09-19 | End: 2023-09-19 | Stop reason: SURG

## 2023-09-19 RX ORDER — HYDROCHLOROTHIAZIDE 12.5 MG/1
6.25 TABLET ORAL DAILY
Status: DISCONTINUED | OUTPATIENT
Start: 2023-09-19 | End: 2023-09-20

## 2023-09-19 RX ORDER — LIDOCAINE HYDROCHLORIDE 20 MG/ML
JELLY TOPICAL AS NEEDED
Status: DISCONTINUED | OUTPATIENT
Start: 2023-09-19 | End: 2023-09-19 | Stop reason: HOSPADM

## 2023-09-19 RX ORDER — MORPHINE SULFATE 4 MG/ML
4 INJECTION, SOLUTION INTRAMUSCULAR; INTRAVENOUS ONCE
Status: COMPLETED | OUTPATIENT
Start: 2023-09-19 | End: 2023-09-19

## 2023-09-19 RX ORDER — MORPHINE SULFATE 2 MG/ML
2 INJECTION, SOLUTION INTRAMUSCULAR; INTRAVENOUS EVERY 2 HOUR PRN
Status: DISCONTINUED | OUTPATIENT
Start: 2023-09-19 | End: 2023-09-20

## 2023-09-19 RX ORDER — HYDROMORPHONE HYDROCHLORIDE 1 MG/ML
0.4 INJECTION, SOLUTION INTRAMUSCULAR; INTRAVENOUS; SUBCUTANEOUS EVERY 5 MIN PRN
Status: DISCONTINUED | OUTPATIENT
Start: 2023-09-19 | End: 2023-09-19 | Stop reason: HOSPADM

## 2023-09-19 RX ORDER — ONDANSETRON 2 MG/ML
INJECTION INTRAMUSCULAR; INTRAVENOUS AS NEEDED
Status: DISCONTINUED | OUTPATIENT
Start: 2023-09-19 | End: 2023-09-19 | Stop reason: SURG

## 2023-09-19 RX ORDER — ROCURONIUM BROMIDE 10 MG/ML
INJECTION, SOLUTION INTRAVENOUS AS NEEDED
Status: DISCONTINUED | OUTPATIENT
Start: 2023-09-19 | End: 2023-09-19 | Stop reason: SURG

## 2023-09-19 RX ORDER — ONDANSETRON 2 MG/ML
4 INJECTION INTRAMUSCULAR; INTRAVENOUS ONCE
Status: COMPLETED | OUTPATIENT
Start: 2023-09-19 | End: 2023-09-19

## 2023-09-19 RX ORDER — MORPHINE SULFATE 4 MG/ML
2 INJECTION, SOLUTION INTRAMUSCULAR; INTRAVENOUS EVERY 10 MIN PRN
Status: DISCONTINUED | OUTPATIENT
Start: 2023-09-19 | End: 2023-09-19 | Stop reason: HOSPADM

## 2023-09-19 RX ORDER — LABETALOL HYDROCHLORIDE 5 MG/ML
5 INJECTION, SOLUTION INTRAVENOUS EVERY 5 MIN PRN
Status: DISCONTINUED | OUTPATIENT
Start: 2023-09-19 | End: 2023-09-19 | Stop reason: HOSPADM

## 2023-09-19 RX ORDER — SENNOSIDES 8.6 MG
17.2 TABLET ORAL NIGHTLY PRN
Status: DISCONTINUED | OUTPATIENT
Start: 2023-09-19 | End: 2023-09-20

## 2023-09-19 RX ORDER — CITRIC ACID/SODIUM CITRATE 334-500MG
SOLUTION, ORAL ORAL AS NEEDED
Status: DISCONTINUED | OUTPATIENT
Start: 2023-09-19 | End: 2023-09-19 | Stop reason: SURG

## 2023-09-19 RX ORDER — SODIUM CHLORIDE, SODIUM LACTATE, POTASSIUM CHLORIDE, CALCIUM CHLORIDE 600; 310; 30; 20 MG/100ML; MG/100ML; MG/100ML; MG/100ML
INJECTION, SOLUTION INTRAVENOUS CONTINUOUS
Status: DISCONTINUED | OUTPATIENT
Start: 2023-09-19 | End: 2023-09-19 | Stop reason: HOSPADM

## 2023-09-19 RX ORDER — HEPARIN SODIUM 5000 [USP'U]/ML
5000 INJECTION, SOLUTION INTRAVENOUS; SUBCUTANEOUS EVERY 8 HOURS SCHEDULED
Status: DISCONTINUED | OUTPATIENT
Start: 2023-09-19 | End: 2023-09-20

## 2023-09-19 RX ORDER — HYDROMORPHONE HYDROCHLORIDE 1 MG/ML
0.6 INJECTION, SOLUTION INTRAMUSCULAR; INTRAVENOUS; SUBCUTANEOUS EVERY 5 MIN PRN
Status: DISCONTINUED | OUTPATIENT
Start: 2023-09-19 | End: 2023-09-19 | Stop reason: HOSPADM

## 2023-09-19 RX ORDER — ATORVASTATIN CALCIUM 40 MG/1
40 TABLET, FILM COATED ORAL NIGHTLY
Status: DISCONTINUED | OUTPATIENT
Start: 2023-09-19 | End: 2023-09-20

## 2023-09-19 RX ORDER — EPHEDRINE SULFATE 50 MG/ML
INJECTION, SOLUTION INTRAVENOUS AS NEEDED
Status: DISCONTINUED | OUTPATIENT
Start: 2023-09-19 | End: 2023-09-19 | Stop reason: SURG

## 2023-09-19 RX ORDER — SODIUM CHLORIDE 9 MG/ML
INJECTION, SOLUTION INTRAVENOUS CONTINUOUS
Status: DISCONTINUED | OUTPATIENT
Start: 2023-09-19 | End: 2023-09-20

## 2023-09-19 RX ORDER — POLYETHYLENE GLYCOL 3350 17 G/17G
17 POWDER, FOR SOLUTION ORAL DAILY PRN
Status: DISCONTINUED | OUTPATIENT
Start: 2023-09-19 | End: 2023-09-20

## 2023-09-19 RX ORDER — BISOPROLOL FUMARATE AND HYDROCHLOROTHIAZIDE 2.5; 6.25 MG/1; MG/1
1 TABLET ORAL DAILY
Status: DISCONTINUED | OUTPATIENT
Start: 2023-09-20 | End: 2023-09-19 | Stop reason: ALTCHOICE

## 2023-09-19 RX ORDER — ONDANSETRON 2 MG/ML
4 INJECTION INTRAMUSCULAR; INTRAVENOUS EVERY 6 HOURS PRN
Status: DISCONTINUED | OUTPATIENT
Start: 2023-09-19 | End: 2023-09-20

## 2023-09-19 RX ORDER — CEFAZOLIN SODIUM/WATER 2 G/20 ML
2 SYRINGE (ML) INTRAVENOUS EVERY 8 HOURS
Status: COMPLETED | OUTPATIENT
Start: 2023-09-19 | End: 2023-09-19

## 2023-09-19 RX ORDER — DEXAMETHASONE SODIUM PHOSPHATE 4 MG/ML
VIAL (ML) INJECTION AS NEEDED
Status: DISCONTINUED | OUTPATIENT
Start: 2023-09-19 | End: 2023-09-19 | Stop reason: SURG

## 2023-09-19 RX ORDER — MORPHINE SULFATE 2 MG/ML
1 INJECTION, SOLUTION INTRAMUSCULAR; INTRAVENOUS EVERY 2 HOUR PRN
Status: DISCONTINUED | OUTPATIENT
Start: 2023-09-19 | End: 2023-09-20

## 2023-09-19 RX ORDER — BISACODYL 10 MG
10 SUPPOSITORY, RECTAL RECTAL
Status: DISCONTINUED | OUTPATIENT
Start: 2023-09-19 | End: 2023-09-20

## 2023-09-19 RX ORDER — METOCLOPRAMIDE HYDROCHLORIDE 5 MG/ML
10 INJECTION INTRAMUSCULAR; INTRAVENOUS EVERY 8 HOURS PRN
Status: DISCONTINUED | OUTPATIENT
Start: 2023-09-19 | End: 2023-09-20

## 2023-09-19 RX ORDER — GLYCOPYRROLATE 0.2 MG/ML
INJECTION, SOLUTION INTRAMUSCULAR; INTRAVENOUS AS NEEDED
Status: DISCONTINUED | OUTPATIENT
Start: 2023-09-19 | End: 2023-09-19 | Stop reason: SURG

## 2023-09-19 RX ORDER — NALOXONE HYDROCHLORIDE 0.4 MG/ML
0.08 INJECTION, SOLUTION INTRAMUSCULAR; INTRAVENOUS; SUBCUTANEOUS AS NEEDED
Status: DISCONTINUED | OUTPATIENT
Start: 2023-09-19 | End: 2023-09-19 | Stop reason: HOSPADM

## 2023-09-19 RX ORDER — PHENYLEPHRINE HCL 10 MG/ML
VIAL (ML) INJECTION AS NEEDED
Status: DISCONTINUED | OUTPATIENT
Start: 2023-09-19 | End: 2023-09-19 | Stop reason: SURG

## 2023-09-19 RX ORDER — MORPHINE SULFATE 10 MG/ML
6 INJECTION, SOLUTION INTRAMUSCULAR; INTRAVENOUS EVERY 10 MIN PRN
Status: DISCONTINUED | OUTPATIENT
Start: 2023-09-19 | End: 2023-09-19 | Stop reason: HOSPADM

## 2023-09-19 RX ADMIN — DEXAMETHASONE SODIUM PHOSPHATE 8 MG: 4 MG/ML VIAL (ML) INJECTION at 16:30:00

## 2023-09-19 RX ADMIN — SODIUM CHLORIDE, SODIUM LACTATE, POTASSIUM CHLORIDE, CALCIUM CHLORIDE: 600; 310; 30; 20 INJECTION, SOLUTION INTRAVENOUS at 16:46:00

## 2023-09-19 RX ADMIN — ACETAMINOPHEN 1000 MG: 10 INJECTION, SOLUTION INTRAVENOUS at 16:02:00

## 2023-09-19 RX ADMIN — EPHEDRINE SULFATE 10 MG: 50 INJECTION, SOLUTION INTRAVENOUS at 16:49:00

## 2023-09-19 RX ADMIN — ROCURONIUM BROMIDE 10 MG: 10 INJECTION, SOLUTION INTRAVENOUS at 16:29:00

## 2023-09-19 RX ADMIN — FAMOTIDINE 20 MG: 10 INJECTION, SOLUTION INTRAVENOUS at 16:05:00

## 2023-09-19 RX ADMIN — EPHEDRINE SULFATE 5 MG: 50 INJECTION, SOLUTION INTRAVENOUS at 16:54:00

## 2023-09-19 RX ADMIN — PHENYLEPHRINE HCL 100 MCG: 10 MG/ML VIAL (ML) INJECTION at 16:54:00

## 2023-09-19 RX ADMIN — SODIUM CHLORIDE, SODIUM LACTATE, POTASSIUM CHLORIDE, CALCIUM CHLORIDE: 600; 310; 30; 20 INJECTION, SOLUTION INTRAVENOUS at 16:22:00

## 2023-09-19 RX ADMIN — ONDANSETRON 4 MG: 2 INJECTION INTRAMUSCULAR; INTRAVENOUS at 16:28:00

## 2023-09-19 RX ADMIN — GLYCOPYRROLATE 0.2 MG: 0.2 INJECTION, SOLUTION INTRAMUSCULAR; INTRAVENOUS at 16:42:00

## 2023-09-19 RX ADMIN — EPHEDRINE SULFATE 10 MG: 50 INJECTION, SOLUTION INTRAVENOUS at 16:44:00

## 2023-09-19 RX ADMIN — CEFAZOLIN SODIUM/WATER 2 G: 2 G/20 ML SYRINGE (ML) INTRAVENOUS at 16:33:00

## 2023-09-19 RX ADMIN — LIDOCAINE HYDROCHLORIDE 50 MG: 10 INJECTION, SOLUTION EPIDURAL; INFILTRATION; INTRACAUDAL; PERINEURAL at 16:29:00

## 2023-09-19 RX ADMIN — LIDOCAINE HYDROCHLORIDE 50 MG: 10 INJECTION, SOLUTION EPIDURAL; INFILTRATION; INTRACAUDAL; PERINEURAL at 16:31:00

## 2023-09-19 RX ADMIN — CITRIC ACID/SODIUM CITRATE 30 ML: 334-500MG SOLUTION, ORAL ORAL at 16:20:00

## 2023-09-19 NOTE — OPERATIVE REPORT
Procedure Date: 9/19/2023    Patient Name: Divya Stout  Preoperative Diagnosis: right distal ureteral stone , hydronephrosis, bilateral flank pain  Postoperative Diagnosis: same  Primary Surgeon: Surgeon(s) and Role:   * Junior Blanca MD - Primary   Assistant: none  Procedures: cystoscopy, bilateral retrograde pyelogram, right ureteroscopy, stone manipulation, attempted stone extraction, ureteral stent placement  Surgical Findings: large right distal ureteral stone, manipulated, could not removed. Stented with 6Fr x 24cm in right  Operative Indications: 73 yo female with a history of renal stones presents with 1 day history of acute right flank pain and nausea, emesis. She had persistent nausea and CT revealed a large distal ureteral stone with hydronephrosis. We reviewed the risk of procedure above and she expressed understanding and agreed to proceed. Anesthesia: general  Complications: none  Specimen: none  Drains: none  Condition: stable  Estimated Blood Loss: none  Operative Details:       ZAKI Stout     Operative Note: We identified the patient and induced with general anesthesia using a LMA/ET tube. The patient was then repositioned into dorsal lithotomy. A surgical timeout was called, and the patient's groin was prepared and draped in sterile fashion. Using a 22Fr rigid cystoscope, we performed cystoscopy. The bladder had  a cystitis appearance. We cannulated the left ureter and placed a notthingham dilator over a wire. A retrograde pyelogram was obtained, findings are described above - there was no hydronephrosis on the left. We placed the wire into the right and identidied the right distal ureteral stone. We obtained a retrograde pyelograms which revealed severe hydroureter and hydronephrosis. We manipulated the stone and there was significant cloudy efflux from the ureter. We placed the ureteroscope into the distal ureter and attempted but were not successful.    We replaced the wire into the collecting systems and using fluoroscopic and visual guidance placed a 6Fr x 24cm double J stent. The bladder was emptied. Lidocaine gel was placed into his urethra. There were no complications. The patient was awoken from anesthesia, extubated, and transferred to the PACU in stable condition. Disposition:to [facility]  Plan for ureteroscopy in 1 week.         Gregorio Steinberg MD

## 2023-09-19 NOTE — ANESTHESIA POSTPROCEDURE EVALUATION
Patient: Delicia Stout    Procedure Summary       Date: 09/19/23 Room / Location: [address] [place] [address] MAIN OR 14 / [address] [place] [address] MAIN OR    Anesthesia Start: 7756 Anesthesia Stop:     Procedure: CYSTOSCOPY, BILATERAL RETROGRADE, RIGHT URETERAL STENT PLACEMENT, STONE MANIPULATION, RIGHT URETEROSCOPY (Right: Bladder) Diagnosis:       Hydronephrosis      (Hydronephrosis [N13.30])    Surgeons: Adri Ramos MD Anesthesiologist: Binu Ramos MD    Anesthesia Type: general ASA Status: 3            Anesthesia Type: general    Vitals Value Taken Time   BP  09/19/23 1713   Temp 97.3 09/19/23 1713   Pulse 76 09/19/23 1713   Resp 14 09/19/23 1713   SpO2 90 % 09/19/23 1713   Vitals shown include unfiled device data.     [address] [place] [address] AN Post Evaluation:   Patient Evaluated in PACU  Patient Participation: complete - patient participated  Level of Consciousness: awake and alert  Pain Score: 1  Pain Management: adequate  Airway Patency:patent  Dental exam unchanged from preop  Yes    Cardiovascular Status: stable  Respiratory Status: nasal cannula  Postoperative Hydration stable      SYED ETIENNE MD  9/19/2023 5:13 PM

## 2023-09-19 NOTE — ED INITIAL ASSESSMENT (HPI)
Pt to ED for lower abdominal/flank pain since yesterday. Pt denies any urinary symptoms, has history of kidney stones.

## 2023-09-19 NOTE — ED QUICK NOTES
Orders for admission, patient is aware of plan and ready to go upstairs. Any questions, please call ED RN Prashanth Allen at extension [phone].      Patient Covid vaccination status: Fully vaccinated     COVID Test Ordered in ED: None    COVID Suspicion at Admission: N/A    Running Infusions:  None    Mental Status/LOC at time of transport: a/o x 4    Other pertinent information:   CIWA score: N/A   NIH score:  N/A

## 2023-09-19 NOTE — CONSULTS
[facility] AND CLINICS  Report of Urology Consultation    Aries Stout Patient Status:  Emergency    4/10/1950 MRN [id number]   Location [address] Attending Marylene Mimes, MD   Hosp Day # 0 PCP Rashi Niño MD     Reason for Consultation:  Right ureteral calculus    History of Present Illness:  Arya Varghese is a a(n) 74year old female. She has a history of renal calculi and has had ESWL in the past. She presented with right lower quadrant abdominal pain that became bilateral pain. She had nausea and emesis. She did not have fever or chills. She did not have dysuria or hematuria. A CT showed a 6,5 mm distal right ureteral calculus. She also had multiple bilateral renal calculi. History:  Past Medical History:   Diagnosis Date    Asthma     seasonal    Calculus of kidney     CVA (cerebral vascular accident) [facility]     Disorder of thyroid     Essential hypertension     High blood pressure     High cholesterol     Hx of lithotripsy     Hyperlipidemia     Osteoarthritis     Sepsis ([facility] 2018    Stroke ([facility] 2018    TIA    Thyroid disease      Past Surgical History:   Procedure Laterality Date    CHOLECYSTECTOMY      COLON CA SCRN NOT  W Th  IND N/A 2015    Procedure: COLONOSCOPY, POSSIBLE BIOPSY, POSSIBLE POLYPECTOMY 83743;  Surgeon:  Indira Soler MD;  Location: [address] [place] [address]    COLONOSCOPY N/A 2022    Procedure: COLONOSCOPY, with polypectomy;  Surgeon: Kalyan Zheng MD;  Location: [address] [place] [address]  2019    Cystoscopy, dilation, B/L RPG, Rt URS, LL, stone extraction-Dr. Shellie Lindsey 71    OTHER SURGICAL HISTORY  2019    cysto stent removal dr Lashae Aiken  10/29/2019     cysto, left urs, left rpg, stone extractino, magnolia laser litho, left stent exchange    OTHER SURGICAL HISTORY  10/28/2021    cysto stent removal Dr. Amilcar Oleary History   Problem Relation Age of Onset    Breast Cancer Mother         age 76    Heart Disorder Father         age 80    Stroke Father       reports that she has quit smoking. She has never used smokeless tobacco. She reports current alcohol use of about 7.0 standard drinks of alcohol per week. She reports that she does not use drugs. Allergies:    Codeine                 ITCHING    Medications:  No current facility-administered medications for this encounter. Review of Systems:  Pertinent items are noted in HPI. Physical Exam:  Awake, alert, in no acute distress. HEENT: Normocephalic. Chest: Respirations non labored. Abdomen: Soft, non tender. : No flank tenderness. Laboratory Data:  Lab Results   Component Value Date    WBC 10.4 09/19/2023    HGB 15.2 09/19/2023    HCT 44.9 09/19/2023    .0 09/19/2023    CREATSERUM 0.85 09/19/2023    BUN 16 09/19/2023     09/19/2023    K 3.7 09/19/2023     09/19/2023    CO2 25.0 09/19/2023     09/19/2023    CA 9.5 09/19/2023       Imaging:  CT Scan report pending. She has a 6.5 mm obstructing distal right ureteral calculus as well as multiple bilateral renal calculi. Impression:  Patient Active Problem List:     Essential hypertension     Acquired hypothyroidism     History of CVA (cerebrovascular accident)     Patent foramen ovale with right to left shunt     Bilateral carotid artery occlusion     Thoracic aorta atherosclerosis (HCC)     Obesity, morbid, BMI 40.0-49.9 ([address]     Recurrent kidney stones     Cerebellar atrophy (HCC)     Positive colorectal cancer screening using Cologuard test     Bilateral carotid artery disease, unspecified type ([address]     Vitamin B 12 deficiency     Vitamin D deficiency     Kidney stone      Right distal ureteral calculus  Bilateral renal calculi    Recommendations:   We will plan cystoscopy, bilateral retrograde pyelograms, right ureteroscopy, laser lithotripsy, and stent placement today for her right ureteral calculus. Her renal calculi will likely need to be addressed in the future. Thank you for allowing me to participate in the care of your patient.     Michaelle Pérez MD  9/19/2023  7:22 AM

## 2023-09-19 NOTE — ANESTHESIA PROCEDURE NOTES
Airway  Date/Time: 9/19/2023 4:31 PM  Urgency: Elective    Airway not difficult    General Information and Staff    Patient location during procedure: OR  Resident/CRNA: Evie Whelan CRNA  Performed: CRNA   Performed by: Evie Whelan CRNA  Authorized by: Ouida Mcburney, MD      Indications and Patient Condition  Indications for airway management: anesthesia  Sedation level: deep  Preoxygenated: yes  Patient position: sniffing  Mask difficulty assessment: 0 - not attempted    Final Airway Details  Final airway type: endotracheal airway      Successful airway: ETT  Cuffed: yes   Successful intubation technique: Video laryngoscopy  Facilitating devices/methods: intubating stylet  Endotracheal tube insertion site: oral  Blade type: Garcia X3. Blade size: #3  ETT size (mm): 7.0    Cormack-Lehane Classification: grade I - full view of glottis  Placement verified by: capnometry   Cuff volume (mL): 8  Measured from: teeth  ETT to teeth (cm): 21  Number of attempts at approach: 1    Additional Comments  Atraumatic insertion, dentition and soft structures as preop. Soft bite block inserted between left molars, tongue midline.

## 2023-09-20 VITALS
TEMPERATURE: 98 F | RESPIRATION RATE: 18 BRPM | OXYGEN SATURATION: 93 % | HEIGHT: 68 IN | SYSTOLIC BLOOD PRESSURE: 122 MMHG | HEART RATE: 60 BPM | BODY MASS INDEX: 36.98 KG/M2 | DIASTOLIC BLOOD PRESSURE: 46 MMHG | WEIGHT: 244 LBS

## 2023-09-20 RX ORDER — TAMSULOSIN HYDROCHLORIDE 0.4 MG/1
0.4 CAPSULE ORAL DAILY
Qty: 20 CAPSULE | Refills: 0 | Status: SHIPPED | OUTPATIENT
Start: 2023-09-20

## 2023-09-20 RX ORDER — TROSPIUM CHLORIDE 20 MG/1
20 TABLET, FILM COATED ORAL 2 TIMES DAILY
Qty: 20 TABLET | Refills: 0 | Status: SHIPPED | OUTPATIENT
Start: 2023-09-20

## 2023-09-20 NOTE — DISCHARGE SUMMARY
General Medicine Discharge Summary     Patient ID:  Nataly Stout  74year old  4/10/1950    Admit date: 9/19/2023    Discharge date and time: 09/20/23    Attending Physician: Kishore Kelly MD     Primary Care Physician: Jeane Lee MD     Reason for admission: kidney stone    Discharge Diagnoses: Kidney stone [N20.0]    Discharged Condition: stable    Disposition: home    Consults:   Consultants         Provider   Jorge Macias MD  Consulting Physician UROLOGY              HPI: Ms. Sadie Pat is a 75 yo F with PMH of CVA, HTN, HL kidney stones who presented with abdominal pain. Patient states symptoms started yesterday around 4PM. Initially RLQ but then moved to both sides. Felt similar to prior kidney stones. No blood in urine. No dysuria or fevers. In the ED, CT with 9mm obstructing distal R ureteral calculus with moderate/severe hydroureteronephrosis    Hospital Course:     Ms. Sadie Pat is a 75 yo F with PMH of CVA, HTN, HL kidney stones who presented with abdominal pain, found to have obstructing nephrolithiasis with hydronephrosis, urology consulted, s/p stent placement on 9/19. Pain resolved. Stable for discharge home, f/u with urology as outpatient. Urology recommending flomax and trospium for stent discomfort. Scripts sent.  F/u PCP in on 9/26 at 11:30. coordinator texted     Obstructing distal R nephrolithiasis with moderate/severe hydroureteronephrosis  - urology consulted, plan for cystoscopy with stent placement 9/19  - will likely need additional procedures as outpatient  - pain meds     Hypothyroidism  - continue synthroid     HTN  - continue home bisoprolol-hydrochlorothiazide      Exam  GEN: female in NAD  HEENT: EOMI  Pulm: CTAB, no crackles or wheezes  CV: RRR, no murmurs  ABD: Soft, non-tender, non-distended, +BS  SKIN: warm, dry  EXT: no edema    Operative Procedures: Procedure(s) (LRB):  CYSTOSCOPY, BILATERAL RETROGRADE, RIGHT URETERAL STENT PLACEMENT, STONE MANIPULATION, RIGHT URETEROSCOPY (Right)     Imaging: XR OR - N/C    Result Date: 9/19/2023   Fluoroscopy support was provided. Images demonstrate bilateral retrograde pyelograms and right ureteral stent placement. Please see separate report for additional details. Dictated by (CST): Layne Rosas MD on 9/19/2023 at 5:57 PM     Finalized by (CST): Layne Rosas MD on 9/19/2023 at 5:58 PM          CT ABDOMEN+PELVIS Keysha Artist 2D RNDR(NO IV,NO ORAL)(CPT=[id number])    Result Date: 9/19/2023  CONCLUSION:  1. 9 mm obstructing distal right ureteral calculus with moderate to severe hydroureteronephrosis. 2. Multiple bilateral intrarenal calculi. 3. Large hiatal hernia containing stomach, pancreas, and minimally protruding colon. 4. Compressive atelectasis in the left lower lobe and inferior lingula. 5. Atherosclerotic vascular calcification including coronary artery calcification. Dictated by (CST): Boyd Watson MD on 9/19/2023 at 10:02 AM     Finalized by (CST): Boyd Watson MD on 9/19/2023 at 10:13 AM               Home Medication Changes:     I reconciled current and discharge medications on day of discharge. These medication changes have been made as below         Medication List        CONTINUE taking these medications      atorvastatin 40 MG Tabs  Commonly known as: Lipitor  TAKE 1 TABLET (40 MG TOTAL) BY MOUTH ONCE DAILY. bisoprolol-hydrochlorothiazide 2.5-6.25 MG Tabs  Commonly known as: Ziac  TAKE 1 TABLET EVERY DAY     levothyroxine 125 MCG Tabs  Commonly known as: Synthroid  TAKE 1 TABLET (125 MCG TOTAL) BY MOUTH BEFORE BREAKFAST. Turmeric Curcumin 500 MG Caps  Take 500 mg by mouth 2 (two) times a day. Activity: activity as tolerated  Diet: regular diet  Wound Care: none needed  Code Status: Full Code  O2: n/a    Follow-up with:    PCP   Specialist urology       FU   Follow-up Information       Fabricio Goodman MD Follow up in 1 week(s).     Specialties: Internal Medicine, PEDIATRICS  Contact information:  [address] [facility] [address] instructions:         Follow-up with labs: none    Total Time Coordinating Care: 31 minutes    Patient had opportunity to ask questions and state understand and agree with therapeutic plan as outlined      Coni Ag MD  [facility] Hospitalist

## 2023-09-20 NOTE — PLAN OF CARE
Problem: Patient Centered Care  Goal: Patient preferences are identified and integrated in the patient's plan of care  Description: Interventions:  - What would you like us to know as we care for you?   - Provide timely, complete, and accurate information to patient/family  - Incorporate patient and family knowledge, values, beliefs, and cultural backgrounds into the planning and delivery of care  - Encourage patient/family to participate in care and decision-making at the level they choose  - Honor patient and family perspectives and choices  9/20/2023 1036 by Kalpana Payne RN  Outcome: Adequate for Discharge  9/20/2023 1036 by Kalpana Payne RN  Outcome: Progressing     Problem: Patient/Family Goals  Goal: Patient/Family Long Term Goal  Description: Patient's Long Term Goal:     Interventions:  -   - See additional Care Plan goals for specific interventions  9/20/2023 1036 by Kalpana Payne RN  Outcome: Adequate for Discharge  9/20/2023 1036 by Kalpana Payne RN  Outcome: Progressing  Goal: Patient/Family Short Term Goal  Description: Patient's Short Term Goal:     Interventions:   -   - See additional Care Plan goals for specific interventions  9/20/2023 1036 by Kalpana Payne RN  Outcome: Adequate for Discharge  9/20/2023 1036 by Kalpana Payne RN  Outcome: Progressing     Problem: GASTROINTESTINAL - ADULT  Goal: Minimal or absence of nausea and vomiting  Description: INTERVENTIONS:  - Maintain adequate hydration with IV or PO as ordered and tolerated  - Nasogastric tube to low intermittent suction as ordered  - Evaluate effectiveness of ordered antiemetic medications  - Provide nonpharmacologic comfort measures as appropriate  - Advance diet as tolerated, if ordered  - Obtain nutritional consult as needed  - Evaluate fluid balance  9/20/2023 1036 by Kalpana Payne RN  Outcome: Adequate for Discharge  9/20/2023 1036 by Kalpana Payne RN  Outcome: Progressing     Problem: SAFETY ADULT - FALL  Goal: Free from fall injury  Description: INTERVENTIONS:  - Assess pt frequently for physical needs  - Identify cognitive and physical deficits and behaviors that affect risk of falls. - [place] fall precautions as indicated by assessment.  - Educate pt/family on patient safety including physical limitations  - Instruct pt to call for assistance with activity based on assessment  - Modify environment to reduce risk of injury  - Provide assistive devices as appropriate  - Consider OT/PT consult to assist with strengthening/mobility  - Encourage toileting schedule  9/20/2023 1036 by Jose R Roque RN  Outcome: Adequate for Discharge  9/20/2023 1036 by Jose R Roque RN  Outcome: Progressing     Problem: DISCHARGE PLANNING  Goal: Discharge to home or other facility with appropriate resources  Description: INTERVENTIONS:  - Identify barriers to discharge w/pt and caregiver  - Include patient/family/discharge partner in discharge planning  - Arrange for needed discharge resources and transportation as appropriate  - Identify discharge learning needs (meds, wound care, etc)  - Arrange for interpreters to assist at discharge as needed  - Consider post-discharge preferences of patient/family/discharge partner  - Complete POLST form as appropriate  - Assess patient's ability to be responsible for managing their own health  - Refer to Case Management Department for coordinating discharge planning if the patient needs post-hospital services based on physician/LIP order or complex needs related to functional status, cognitive ability or social support system  9/20/2023 1036 by Jose R Roque RN  Outcome: Adequate for Discharge  9/20/2023 1036 by Jose R Roque RN  Outcome: Progressing     Medically stable for discharge. Tolerating general diet. Voiding freely. Pain under control.  Plans to discharge home and follow-up outpatient with urology for stone removal

## 2023-09-20 NOTE — PLAN OF CARE
Pt is alert and oriented x4. Tolerating clear liquid diet, denies any nausea. Declined any pain intervention overnight. Voiding. IVF infusing, Ancef for IV abx. Up with a walker. Family at bedside. Call light within reach. Problem: Patient Centered Care  Goal: Patient preferences are identified and integrated in the patient's plan of care  Description: Interventions:  - What would you like us to know as we care for you?   - Provide timely, complete, and accurate information to patient/family  - Incorporate patient and family knowledge, values, beliefs, and cultural backgrounds into the planning and delivery of care  - Encourage patient/family to participate in care and decision-making at the level they choose  - Honor patient and family perspectives and choices  Outcome: Progressing     Problem: Patient/Family Goals  Goal: Patient/Family Long Term Goal  Description: Patient's Long Term Goal:     Interventions:  -   - See additional Care Plan goals for specific interventions  Outcome: Progressing  Goal: Patient/Family Short Term Goal  Description: Patient's Short Term Goal:     Interventions:   -  - See additional Care Plan goals for specific interventions  Outcome: Progressing     Problem: GASTROINTESTINAL - ADULT  Goal: Minimal or absence of nausea and vomiting  Description: INTERVENTIONS:  - Maintain adequate hydration with IV or PO as ordered and tolerated  - Nasogastric tube to low intermittent suction as ordered  - Evaluate effectiveness of ordered antiemetic medications  - Provide nonpharmacologic comfort measures as appropriate  - Advance diet as tolerated, if ordered  - Obtain nutritional consult as needed  - Evaluate fluid balance  Outcome: Progressing     Problem: SAFETY ADULT - FALL  Goal: Free from fall injury  Description: INTERVENTIONS:  - Assess pt frequently for physical needs  - Identify cognitive and physical deficits and behaviors that affect risk of falls.   - [place] fall precautions as indicated by assessment.  - Educate pt/family on patient safety including physical limitations  - Instruct pt to call for assistance with activity based on assessment  - Modify environment to reduce risk of injury  - Provide assistive devices as appropriate  - Consider OT/PT consult to assist with strengthening/mobility  - Encourage toileting schedule  Outcome: Progressing     Problem: DISCHARGE PLANNING  Goal: Discharge to home or other facility with appropriate resources  Description: INTERVENTIONS:  - Identify barriers to discharge w/pt and caregiver  - Include patient/family/discharge partner in discharge planning  - Arrange for needed discharge resources and transportation as appropriate  - Identify discharge learning needs (meds, wound care, etc)  - Arrange for interpreters to assist at discharge as needed  - Consider post-discharge preferences of patient/family/discharge partner  - Complete POLST form as appropriate  - Assess patient's ability to be responsible for managing their own health  - Refer to Case Management Department for coordinating discharge planning if the patient needs post-hospital services based on physician/LIP order or complex needs related to functional status, cognitive ability or social support system  Outcome: Progressing

## 2023-09-20 NOTE — PLAN OF CARE
Admitted from ED. Patient NPO throughout day. IVF infusing. Sent to surgery this evening. Received from PACU. Patient given ant-emetics in PACU, no c/o pain. Per patient nausea better when she arrived to room. Up with walker and 1 assist.  at bedside. Safety plan in place. Discharge home with  when medically stable.      Problem: Patient Centered Care  Goal: Patient preferences are identified and integrated in the patient's plan of care  Description: Interventions:  - What would you like us to know as we care for you?   - Provide timely, complete, and accurate information to patient/family  - Incorporate patient and family knowledge, values, beliefs, and cultural backgrounds into the planning and delivery of care  - Encourage patient/family to participate in care and decision-making at the level they choose  - Honor patient and family perspectives and choices  Outcome: Progressing     Problem: Patient/Family Goals  Goal: Patient/Family Short Term Goal  Description: Patient's Short Term Goal: Feel better    Monitor pain  Monitor VS  Monitor labs  administer medications  Keep patient updated on plan of care  Attentive listening       Monitor pain  Monitor VS  Monitor labs  administer medications  Keep patient updated on plan of care  Attentive listening     - See additional Care Plan goals for specific interventions  Outcome: Progressing     Problem: GASTROINTESTINAL - ADULT  Goal: Minimal or absence of nausea and vomiting  Description: INTERVENTIONS:  - Maintain adequate hydration with IV or PO as ordered and tolerated  - Nasogastric tube to low intermittent suction as ordered  - Evaluate effectiveness of ordered antiemetic medications  - Provide nonpharmacologic comfort measures as appropriate  - Advance diet as tolerated, if ordered  - Obtain nutritional consult as needed  - Evaluate fluid balance  Outcome: Progressing     Problem: SAFETY ADULT - FALL  Goal: Free from fall injury  Description: INTERVENTIONS:  - Assess pt frequently for physical needs  - Identify cognitive and physical deficits and behaviors that affect risk of falls.   - [place] fall precautions as indicated by assessment.  - Educate pt/family on patient safety including physical limitations  - Instruct pt to call for assistance with activity based on assessment  - Modify environment to reduce risk of injury  - Provide assistive devices as appropriate  - Consider OT/PT consult to assist with strengthening/mobility  - Encourage toileting schedule  Outcome: Progressing     Problem: DISCHARGE PLANNING  Goal: Discharge to home or other facility with appropriate resources  Description: INTERVENTIONS:  - Identify barriers to discharge w/pt and caregiver  - Include patient/family/discharge partner in discharge planning  - Arrange for needed discharge resources and transportation as appropriate  - Identify discharge learning needs (meds, wound care, etc)  - Arrange for interpreters to assist at discharge as needed  - Consider post-discharge preferences of patient/family/discharge partner  - Complete POLST form as appropriate  - Assess patient's ability to be responsible for managing their own health  - Refer to Case Management Department for coordinating discharge planning if the patient needs post-hospital services based on physician/LIP order or complex needs related to functional status, cognitive ability or social support system  Outcome: Progressing

## 2023-09-28 RX ORDER — ASPIRIN 325 MG
325 TABLET ORAL DAILY
COMMUNITY

## 2023-10-01 ENCOUNTER — ANESTHESIA EVENT (OUTPATIENT)
Dept: SURGERY | Facility: HOSPITAL | Age: 73
End: 2023-10-01
Payer: MEDICARE

## 2023-10-02 ENCOUNTER — HOSPITAL ENCOUNTER (OUTPATIENT)
Facility: HOSPITAL | Age: 73
Setting detail: HOSPITAL OUTPATIENT SURGERY
Discharge: HOME | End: 2023-10-02
Attending: UROLOGY | Admitting: UROLOGY
Payer: MEDICARE

## 2023-10-02 ENCOUNTER — ANESTHESIA (OUTPATIENT)
Dept: SURGERY | Facility: HOSPITAL | Age: 73
End: 2023-10-02
Payer: MEDICARE

## 2023-10-02 ENCOUNTER — APPOINTMENT (OUTPATIENT)
Dept: GENERAL RADIOLOGY | Facility: HOSPITAL | Age: 73
End: 2023-10-02
Attending: UROLOGY
Payer: MEDICARE

## 2023-10-02 VITALS
SYSTOLIC BLOOD PRESSURE: 135 MMHG | BODY MASS INDEX: 34.71 KG/M2 | DIASTOLIC BLOOD PRESSURE: 67 MMHG | RESPIRATION RATE: 16 BRPM | HEART RATE: 59 BPM | HEIGHT: 68 IN | OXYGEN SATURATION: 97 % | WEIGHT: 229 LBS | TEMPERATURE: 97 F

## 2023-10-02 PROCEDURE — BT1D1ZZ FLUOROSCOPY OF RIGHT KIDNEY, URETER AND BLADDER USING LOW OSMOLAR CONTRAST: ICD-10-PCS | Performed by: UROLOGY

## 2023-10-02 PROCEDURE — 0TF38ZZ FRAGMENTATION IN RIGHT KIDNEY PELVIS, VIA NATURAL OR ARTIFICIAL OPENING ENDOSCOPIC: ICD-10-PCS | Performed by: UROLOGY

## 2023-10-02 PROCEDURE — 88300 SURGICAL PATH GROSS: CPT | Performed by: UROLOGY

## 2023-10-02 PROCEDURE — 0T768DZ DILATION OF RIGHT URETER WITH INTRALUMINAL DEVICE, VIA NATURAL OR ARTIFICIAL OPENING ENDOSCOPIC: ICD-10-PCS | Performed by: UROLOGY

## 2023-10-02 PROCEDURE — 0TP98DZ REMOVAL OF INTRALUMINAL DEVICE FROM URETER, VIA NATURAL OR ARTIFICIAL OPENING ENDOSCOPIC: ICD-10-PCS | Performed by: UROLOGY

## 2023-10-02 PROCEDURE — 82365 CALCULUS SPECTROSCOPY: CPT | Performed by: UROLOGY

## 2023-10-02 PROCEDURE — 0TC68ZZ EXTIRPATION OF MATTER FROM RIGHT URETER, VIA NATURAL OR ARTIFICIAL OPENING ENDOSCOPIC: ICD-10-PCS | Performed by: UROLOGY

## 2023-10-02 DEVICE — URETERAL STENT
Type: IMPLANTABLE DEVICE | Site: URETER | Status: FUNCTIONAL
Brand: ASCERTA™

## 2023-10-02 RX ORDER — HYDROMORPHONE HYDROCHLORIDE 1 MG/ML
0.4 INJECTION, SOLUTION INTRAMUSCULAR; INTRAVENOUS; SUBCUTANEOUS EVERY 5 MIN PRN
Status: DISCONTINUED | OUTPATIENT
Start: 2023-10-02 | End: 2023-10-02

## 2023-10-02 RX ORDER — NALOXONE HYDROCHLORIDE 0.4 MG/ML
0.08 INJECTION, SOLUTION INTRAMUSCULAR; INTRAVENOUS; SUBCUTANEOUS AS NEEDED
Status: DISCONTINUED | OUTPATIENT
Start: 2023-10-02 | End: 2023-10-02

## 2023-10-02 RX ORDER — SULFAMETHOXAZOLE AND TRIMETHOPRIM 800; 160 MG/1; MG/1
1 TABLET ORAL 2 TIMES DAILY
Qty: 6 TABLET | Refills: 0 | Status: SHIPPED | OUTPATIENT
Start: 2023-10-02

## 2023-10-02 RX ORDER — METOCLOPRAMIDE 10 MG/1
10 TABLET ORAL ONCE
Status: COMPLETED | OUTPATIENT
Start: 2023-10-02 | End: 2023-10-02

## 2023-10-02 RX ORDER — SODIUM CHLORIDE, SODIUM LACTATE, POTASSIUM CHLORIDE, CALCIUM CHLORIDE 600; 310; 30; 20 MG/100ML; MG/100ML; MG/100ML; MG/100ML
INJECTION, SOLUTION INTRAVENOUS CONTINUOUS
Status: DISCONTINUED | OUTPATIENT
Start: 2023-10-02 | End: 2023-10-02

## 2023-10-02 RX ORDER — HYDROMORPHONE HYDROCHLORIDE 1 MG/ML
0.4 INJECTION, SOLUTION INTRAMUSCULAR; INTRAVENOUS; SUBCUTANEOUS EVERY 5 MIN PRN
Status: CANCELLED | OUTPATIENT
Start: 2023-10-02

## 2023-10-02 RX ORDER — LIDOCAINE HYDROCHLORIDE 10 MG/ML
INJECTION, SOLUTION EPIDURAL; INFILTRATION; INTRACAUDAL; PERINEURAL AS NEEDED
Status: DISCONTINUED | OUTPATIENT
Start: 2023-10-02 | End: 2023-10-02 | Stop reason: SURG

## 2023-10-02 RX ORDER — EPHEDRINE SULFATE 50 MG/ML
INJECTION, SOLUTION INTRAVENOUS AS NEEDED
Status: DISCONTINUED | OUTPATIENT
Start: 2023-10-02 | End: 2023-10-02 | Stop reason: SURG

## 2023-10-02 RX ORDER — HYDROMORPHONE HYDROCHLORIDE 1 MG/ML
0.2 INJECTION, SOLUTION INTRAMUSCULAR; INTRAVENOUS; SUBCUTANEOUS EVERY 5 MIN PRN
Status: DISCONTINUED | OUTPATIENT
Start: 2023-10-02 | End: 2023-10-02

## 2023-10-02 RX ORDER — IOPAMIDOL 612 MG/ML
INJECTION, SOLUTION INTRATHECAL AS NEEDED
Status: DISCONTINUED | OUTPATIENT
Start: 2023-10-02 | End: 2023-10-02 | Stop reason: HOSPADM

## 2023-10-02 RX ORDER — LIDOCAINE HYDROCHLORIDE 40 MG/ML
SOLUTION TOPICAL AS NEEDED
Status: DISCONTINUED | OUTPATIENT
Start: 2023-10-02 | End: 2023-10-02 | Stop reason: SURG

## 2023-10-02 RX ORDER — MORPHINE SULFATE 2 MG/ML
2 INJECTION, SOLUTION INTRAMUSCULAR; INTRAVENOUS EVERY 10 MIN PRN
Status: CANCELLED | OUTPATIENT
Start: 2023-10-02

## 2023-10-02 RX ORDER — FAMOTIDINE 20 MG/1
20 TABLET, FILM COATED ORAL ONCE
Status: COMPLETED | OUTPATIENT
Start: 2023-10-02 | End: 2023-10-02

## 2023-10-02 RX ORDER — HYDROMORPHONE HYDROCHLORIDE 1 MG/ML
0.6 INJECTION, SOLUTION INTRAMUSCULAR; INTRAVENOUS; SUBCUTANEOUS EVERY 5 MIN PRN
Status: CANCELLED | OUTPATIENT
Start: 2023-10-02

## 2023-10-02 RX ORDER — ACETAMINOPHEN 500 MG
1000 TABLET ORAL ONCE
Status: COMPLETED | OUTPATIENT
Start: 2023-10-02 | End: 2023-10-02

## 2023-10-02 RX ORDER — ROCURONIUM BROMIDE 10 MG/ML
INJECTION, SOLUTION INTRAVENOUS AS NEEDED
Status: DISCONTINUED | OUTPATIENT
Start: 2023-10-02 | End: 2023-10-02 | Stop reason: SURG

## 2023-10-02 RX ORDER — PHENYLEPHRINE HCL 10 MG/ML
VIAL (ML) INJECTION AS NEEDED
Status: DISCONTINUED | OUTPATIENT
Start: 2023-10-02 | End: 2023-10-02 | Stop reason: SURG

## 2023-10-02 RX ORDER — GLYCOPYRROLATE 0.2 MG/ML
INJECTION, SOLUTION INTRAMUSCULAR; INTRAVENOUS AS NEEDED
Status: DISCONTINUED | OUTPATIENT
Start: 2023-10-02 | End: 2023-10-02 | Stop reason: SURG

## 2023-10-02 RX ORDER — HYDROMORPHONE HYDROCHLORIDE 1 MG/ML
0.6 INJECTION, SOLUTION INTRAMUSCULAR; INTRAVENOUS; SUBCUTANEOUS EVERY 5 MIN PRN
Status: DISCONTINUED | OUTPATIENT
Start: 2023-10-02 | End: 2023-10-02

## 2023-10-02 RX ORDER — NALOXONE HYDROCHLORIDE 0.4 MG/ML
0.08 INJECTION, SOLUTION INTRAMUSCULAR; INTRAVENOUS; SUBCUTANEOUS AS NEEDED
Status: CANCELLED | OUTPATIENT
Start: 2023-10-02 | End: 2023-10-02

## 2023-10-02 RX ORDER — CEFAZOLIN SODIUM/WATER 2 G/20 ML
2 SYRINGE (ML) INTRAVENOUS ONCE
Status: COMPLETED | OUTPATIENT
Start: 2023-10-02 | End: 2023-10-02

## 2023-10-02 RX ORDER — SODIUM CHLORIDE, SODIUM LACTATE, POTASSIUM CHLORIDE, CALCIUM CHLORIDE 600; 310; 30; 20 MG/100ML; MG/100ML; MG/100ML; MG/100ML
INJECTION, SOLUTION INTRAVENOUS CONTINUOUS
Status: CANCELLED | OUTPATIENT
Start: 2023-10-02

## 2023-10-02 RX ORDER — HYDROMORPHONE HYDROCHLORIDE 1 MG/ML
0.2 INJECTION, SOLUTION INTRAMUSCULAR; INTRAVENOUS; SUBCUTANEOUS EVERY 5 MIN PRN
Status: CANCELLED | OUTPATIENT
Start: 2023-10-02

## 2023-10-02 RX ORDER — DEXAMETHASONE SODIUM PHOSPHATE 4 MG/ML
VIAL (ML) INJECTION AS NEEDED
Status: DISCONTINUED | OUTPATIENT
Start: 2023-10-02 | End: 2023-10-02 | Stop reason: SURG

## 2023-10-02 RX ORDER — ONDANSETRON 2 MG/ML
INJECTION INTRAMUSCULAR; INTRAVENOUS AS NEEDED
Status: DISCONTINUED | OUTPATIENT
Start: 2023-10-02 | End: 2023-10-02 | Stop reason: SURG

## 2023-10-02 RX ADMIN — LIDOCAINE HYDROCHLORIDE 50 MG: 10 INJECTION, SOLUTION EPIDURAL; INFILTRATION; INTRACAUDAL; PERINEURAL at 11:09:00

## 2023-10-02 RX ADMIN — ONDANSETRON 4 MG: 2 INJECTION INTRAMUSCULAR; INTRAVENOUS at 11:09:00

## 2023-10-02 RX ADMIN — CEFAZOLIN SODIUM/WATER 2 G: 2 G/20 ML SYRINGE (ML) INTRAVENOUS at 11:15:00

## 2023-10-02 RX ADMIN — PHENYLEPHRINE HCL 50 MCG: 10 MG/ML VIAL (ML) INJECTION at 11:09:00

## 2023-10-02 RX ADMIN — GLYCOPYRROLATE 0.2 MG: 0.2 INJECTION, SOLUTION INTRAMUSCULAR; INTRAVENOUS at 11:44:00

## 2023-10-02 RX ADMIN — DEXAMETHASONE SODIUM PHOSPHATE 4 MG: 4 MG/ML VIAL (ML) INJECTION at 11:09:00

## 2023-10-02 RX ADMIN — EPHEDRINE SULFATE 10 MG: 50 INJECTION, SOLUTION INTRAVENOUS at 11:39:00

## 2023-10-02 RX ADMIN — EPHEDRINE SULFATE 15 MG: 50 INJECTION, SOLUTION INTRAVENOUS at 11:42:00

## 2023-10-02 RX ADMIN — ROCURONIUM BROMIDE 5 MG: 10 INJECTION, SOLUTION INTRAVENOUS at 11:09:00

## 2023-10-02 RX ADMIN — LIDOCAINE HYDROCHLORIDE 4 ML: 40 SOLUTION TOPICAL at 11:11:00

## 2023-10-02 RX ADMIN — SODIUM CHLORIDE, SODIUM LACTATE, POTASSIUM CHLORIDE, CALCIUM CHLORIDE: 600; 310; 30; 20 INJECTION, SOLUTION INTRAVENOUS at 11:03:00

## 2023-10-02 NOTE — ANESTHESIA POSTPROCEDURE EVALUATION
Patient: Bianka Stout    Procedure Summary       Date: 10/02/23 Room / Location: [facility] MAIN OR 14 / [address] [place] [address] MAIN OR    Anesthesia Start: 1103 Anesthesia Stop: 4269    Procedures:       Cystoscopy, right retrograde pyelogram, right ureteroscopy, basket right stone extraction, right ureteral stent exchange, holmium laser lithotripsy (Right: Ureter)      CYSTOSCOPY URETEROSCOPY (Right: Ureter)      LASER HOLMIUM LITHOTRIPSY (Right: Ureter)      LIGHTED URETERAL STENT INSERTION (Right: Ureter) Diagnosis: (Right ureteral stone with hydronephrosis)    Surgeons: Anthony Escobedo MD Anesthesiologist: Crystal Richards MD    Anesthesia Type: general ASA Status: 2            Anesthesia Type: general    Vitals Value Taken Time   /78 10/02/23 1219   Temp 97.2F 10/02/23 1219   Pulse 81 10/02/23 1219   Resp 18 10/02/23 1219   SpO2 99 % 10/02/23 1219   Vitals shown include unfiled device data.     [address] [place] [address] AN Post Evaluation:   Patient Evaluated in PACU  Patient Participation: complete - patient participated  Level of Consciousness: awake and alert  Pain Score: 0  Pain Management: adequate  Airway Patency:patent  Dental exam unchanged from preop  Yes    Cardiovascular Status: acceptable  Respiratory Status: acceptable  Postoperative Hydration acceptable      Gm Pickard MD  10/2/2023 12:19 PM

## 2023-10-02 NOTE — H&P
H&P from 9-19-23 reviewed  The above referenced H&P was reviewed by Lilo Del Real MD on 10/2/2023, the patient was examined and no significant changes have occurred in the patient's condition since the H&P was performed. Risks and benefits were discussed, proceed with procedure as planned.

## 2023-10-02 NOTE — OPERATIVE REPORT
Urology Operative Note    Date of Service: 10/2/2023  Name: Oleg Salazar  MRN: [id number]  : 4/10/1950    Surgeon: Gladys Abreu MD    Surgery: Cystourethroscopy, right retrograde pyelogram, right ureteroscopy, laser lithotripsy of ureteral stones (x 3) 6mm each and renal stones 7mm each x 3.  6 Fr x 24cm double J stent placement    Operative Findings:multiple right ureteral stones and renal stones    EBL:none    Antibiotics:ancef 2 grams    Specimens:ureteral stones    Operative Indications: Oleg Salazar is a 74year old old female with a history of right renal colic s/p admission and ureteral stent placement for obstructing right ureteral stone. She presents today for ureteroscopy and laser lithotripsy. Risks were discussed and she expressed understanding and agreed to proceed. [employer]. Operative Note: We identified the patient and induced with general anesthesia using a LMA/ET tube. The patient was then repositioned into dorsal lithotomy. A surgical timeout was called, and the patient's groin was prepared and draped in sterile fashion. Using a 22Fr rigid cystoscope, we performed cystoscopy. Using a grasper, we removed the existing ureteral stent and passed a glide wire into the kidney. We passed a rigid ureteroscope into the ureter and identified 3 ureteral stones each 6mm. We used a 200 micron laser fiber and pulverized the stones into fragments that were extracted out of the ureter with a stone basket. We then inspected the length of the ureter and there were no other ureteral stones. We then passed a ureteral access sheath into the ureter and passed a digital ureteroscope into the kidney. We then identified 2 renal stones which was lasered and pulverized to dust.  We did not see any other stones in the calyces to the best of our ability given the dust.  And there were no large calcifications noted on fluoro. We then obtained  retrograde pyelogram findings are described above.   We replaced the wire into the collecting systems and using fluoroscopic and visual guidance placed a 6Fr x 24cm double J stent with a string. The bladder was emptied. There were no complications. The patient was awoken from anesthesia, extubated, and transferred to the PACU in stable condition.         Disposition:stent removal by pulling string in 5-7 days    Moris Frye MD

## 2023-10-02 NOTE — ANESTHESIA PROCEDURE NOTES
Airway  Date/Time: 10/2/2023 11:11 AM  Urgency: elective      General Information and Staff    Patient location during procedure: OR  Anesthesiologist: Glory Redding MD  Performed: anesthesiologist   Performed by: Glory Redding MD  Authorized by: Glory Redding MD      Indications and Patient Condition  Indications for airway management: anesthesia  Spontaneous Ventilation: absent  Sedation level: deep  Preoxygenated: yes  Patient position: sniffing  Mask difficulty assessment: 0 - not attempted    Final Airway Details  Final airway type: endotracheal airway      Successful airway: ETT  Cuffed: yes   Successful intubation technique: direct laryngoscopy  Endotracheal tube insertion site: oral  Blade type: Juan Sinha #3. Blade size: #3  ETT size (mm): 7.0    Cormack-Lehane Classification: grade I - full view of glottis  Placement verified by: capnometry   Cuff volume (mL): 8  Measured from: lips  ETT to lips (cm): 21  Number of attempts at approach: 1    Additional Comments  Smooth atraumatic intubation with pre-lubricated ETT; lips and teeth in preinduction condition. Soft bite block temporarily in place.

## 2023-10-07 LAB
CAOX DIHYDRATE: 10 %
CAOX MONOHYDRATE: 90 %
WEIGHT-STONE: 189 MG

## 2024-08-09 ENCOUNTER — HOSPITAL ENCOUNTER (OUTPATIENT)
Facility: HOSPITAL | Age: 74
Setting detail: OBSERVATION
LOS: 1 days | Discharge: HOME | End: 2024-08-11
Attending: EMERGENCY MEDICINE | Admitting: INTERNAL MEDICINE
Payer: MEDICARE

## 2024-08-09 ENCOUNTER — APPOINTMENT (OUTPATIENT)
Dept: CT IMAGING | Facility: HOSPITAL | Age: 74
End: 2024-08-09
Attending: EMERGENCY MEDICINE
Payer: MEDICARE

## 2024-08-09 ENCOUNTER — ANESTHESIA EVENT (OUTPATIENT)
Dept: SURGERY | Facility: HOSPITAL | Age: 74
End: 2024-08-09
Payer: MEDICARE

## 2024-08-09 ENCOUNTER — ANESTHESIA (OUTPATIENT)
Dept: SURGERY | Facility: HOSPITAL | Age: 74
End: 2024-08-09
Payer: MEDICARE

## 2024-08-09 ENCOUNTER — APPOINTMENT (OUTPATIENT)
Dept: GENERAL RADIOLOGY | Facility: HOSPITAL | Age: 74
End: 2024-08-09
Attending: UROLOGY
Payer: MEDICARE

## 2024-08-09 DIAGNOSIS — N30.90 CYSTITIS: ICD-10-CM

## 2024-08-09 DIAGNOSIS — R10.9 ABDOMINAL PAIN, ACUTE: Primary | ICD-10-CM

## 2024-08-09 DIAGNOSIS — N13.2 URETERAL STONE WITH HYDRONEPHROSIS: ICD-10-CM

## 2024-08-09 LAB
ALBUMIN SERPL-MCNC: 4.3 G/DL (ref 3.2–4.8)
ALBUMIN/GLOB SERPL: 1.6 {RATIO} (ref 1–2)
ALP LIVER SERPL-CCNC: 73 U/L
ALT SERPL-CCNC: 11 U/L
ANION GAP SERPL CALC-SCNC: 6 MMOL/L (ref 0–18)
AST SERPL-CCNC: 21 U/L (ref ?–34)
BASOPHILS # BLD AUTO: 0.06 X10(3) UL (ref 0–0.2)
BASOPHILS NFR BLD AUTO: 0.5 %
BILIRUB SERPL-MCNC: 1.6 MG/DL (ref 0.2–1.1)
BILIRUB UR QL CFM: NEGATIVE
BUN BLD-MCNC: 13 MG/DL (ref 9–23)
BUN/CREAT SERPL: 12.5 (ref 10–20)
CALCIUM BLD-MCNC: 9.4 MG/DL (ref 8.7–10.4)
CHLORIDE SERPL-SCNC: 104 MMOL/L (ref 98–112)
CO2 SERPL-SCNC: 30 MMOL/L (ref 21–32)
CREAT BLD-MCNC: 1.04 MG/DL
DEPRECATED RDW RBC AUTO: 51.5 FL (ref 35.1–46.3)
EGFRCR SERPLBLD CKD-EPI 2021: 56 ML/MIN/1.73M2 (ref 60–?)
EOSINOPHIL # BLD AUTO: 0.04 X10(3) UL (ref 0–0.7)
EOSINOPHIL NFR BLD AUTO: 0.3 %
ERYTHROCYTE [DISTWIDTH] IN BLOOD BY AUTOMATED COUNT: 14.6 % (ref 11–15)
GLOBULIN PLAS-MCNC: 2.7 G/DL (ref 2–3.5)
GLUCOSE BLD-MCNC: 103 MG/DL (ref 70–99)
HCT VFR BLD AUTO: 44.8 %
HGB BLD-MCNC: 15.1 G/DL
IMM GRANULOCYTES # BLD AUTO: 0.04 X10(3) UL (ref 0–1)
IMM GRANULOCYTES NFR BLD: 0.3 %
LYMPHOCYTES # BLD AUTO: 2.2 X10(3) UL (ref 1–4)
LYMPHOCYTES NFR BLD AUTO: 18.5 %
MCH RBC QN AUTO: 32.4 PG (ref 26–34)
MCHC RBC AUTO-ENTMCNC: 33.7 G/DL (ref 31–37)
MCV RBC AUTO: 96.1 FL
MONOCYTES # BLD AUTO: 1.28 X10(3) UL (ref 0.1–1)
MONOCYTES NFR BLD AUTO: 10.8 %
NEUTROPHILS # BLD AUTO: 8.27 X10 (3) UL (ref 1.5–7.7)
NEUTROPHILS # BLD AUTO: 8.27 X10(3) UL (ref 1.5–7.7)
NEUTROPHILS NFR BLD AUTO: 69.6 %
OSMOLALITY SERPL CALC.SUM OF ELEC: 290 MOSM/KG (ref 275–295)
PLATELET # BLD AUTO: 259 10(3)UL (ref 150–450)
POTASSIUM SERPL-SCNC: 3.3 MMOL/L (ref 3.5–5.1)
PROT SERPL-MCNC: 7 G/DL (ref 5.7–8.2)
RBC # BLD AUTO: 4.66 X10(6)UL
RBC #/AREA URNS AUTO: >10 /HPF
SODIUM SERPL-SCNC: 140 MMOL/L (ref 136–145)
SP GR UR STRIP: 1.02 (ref 1–1.03)
WBC # BLD AUTO: 11.9 X10(3) UL (ref 4–11)
WBC #/AREA URNS AUTO: >50 /HPF

## 2024-08-09 PROCEDURE — 81001 URINALYSIS AUTO W/SCOPE: CPT | Performed by: EMERGENCY MEDICINE

## 2024-08-09 PROCEDURE — 0T778DZ DILATION OF LEFT URETER WITH INTRALUMINAL DEVICE, VIA NATURAL OR ARTIFICIAL OPENING ENDOSCOPIC: ICD-10-PCS | Performed by: UROLOGY

## 2024-08-09 PROCEDURE — 81015 MICROSCOPIC EXAM OF URINE: CPT | Performed by: EMERGENCY MEDICINE

## 2024-08-09 PROCEDURE — 99285 EMERGENCY DEPT VISIT HI MDM: CPT

## 2024-08-09 PROCEDURE — 96361 HYDRATE IV INFUSION ADD-ON: CPT

## 2024-08-09 PROCEDURE — 87077 CULTURE AEROBIC IDENTIFY: CPT | Performed by: EMERGENCY MEDICINE

## 2024-08-09 PROCEDURE — 80053 COMPREHEN METABOLIC PANEL: CPT | Performed by: EMERGENCY MEDICINE

## 2024-08-09 PROCEDURE — 74176 CT ABD & PELVIS W/O CONTRAST: CPT | Performed by: EMERGENCY MEDICINE

## 2024-08-09 PROCEDURE — 96365 THER/PROPH/DIAG IV INF INIT: CPT

## 2024-08-09 PROCEDURE — 96375 TX/PRO/DX INJ NEW DRUG ADDON: CPT

## 2024-08-09 PROCEDURE — 87186 SC STD MICRODIL/AGAR DIL: CPT | Performed by: EMERGENCY MEDICINE

## 2024-08-09 PROCEDURE — 85025 COMPLETE CBC W/AUTO DIFF WBC: CPT | Performed by: EMERGENCY MEDICINE

## 2024-08-09 PROCEDURE — 87086 URINE CULTURE/COLONY COUNT: CPT | Performed by: EMERGENCY MEDICINE

## 2024-08-09 DEVICE — URETERAL STENT
Type: IMPLANTABLE DEVICE | Site: URETER | Status: FUNCTIONAL
Brand: ASCERTA™

## 2024-08-09 RX ORDER — MORPHINE SULFATE 4 MG/ML
2 INJECTION, SOLUTION INTRAMUSCULAR; INTRAVENOUS EVERY 10 MIN PRN
Status: DISCONTINUED | OUTPATIENT
Start: 2024-08-09 | End: 2024-08-09 | Stop reason: HOSPADM

## 2024-08-09 RX ORDER — MORPHINE SULFATE 10 MG/ML
6 INJECTION, SOLUTION INTRAMUSCULAR; INTRAVENOUS EVERY 10 MIN PRN
Status: DISCONTINUED | OUTPATIENT
Start: 2024-08-09 | End: 2024-08-09 | Stop reason: HOSPADM

## 2024-08-09 RX ORDER — ATORVASTATIN CALCIUM 40 MG/1
40 TABLET, FILM COATED ORAL
Status: DISCONTINUED | OUTPATIENT
Start: 2024-08-10 | End: 2024-08-11

## 2024-08-09 RX ORDER — HYDROCODONE BITARTRATE AND ACETAMINOPHEN 5; 325 MG/1; MG/1
1 TABLET ORAL EVERY 4 HOURS PRN
Status: DISCONTINUED | OUTPATIENT
Start: 2024-08-09 | End: 2024-08-11

## 2024-08-09 RX ORDER — KETOROLAC TROMETHAMINE 15 MG/ML
15 INJECTION, SOLUTION INTRAMUSCULAR; INTRAVENOUS ONCE
Status: COMPLETED | OUTPATIENT
Start: 2024-08-09 | End: 2024-08-09

## 2024-08-09 RX ORDER — ACETAMINOPHEN 500 MG
1000 TABLET ORAL ONCE AS NEEDED
Status: DISCONTINUED | OUTPATIENT
Start: 2024-08-09 | End: 2024-08-09 | Stop reason: HOSPADM

## 2024-08-09 RX ORDER — HYDROCODONE BITARTRATE AND ACETAMINOPHEN 5; 325 MG/1; MG/1
1 TABLET ORAL ONCE AS NEEDED
Status: DISCONTINUED | OUTPATIENT
Start: 2024-08-09 | End: 2024-08-09 | Stop reason: HOSPADM

## 2024-08-09 RX ORDER — METOCLOPRAMIDE HYDROCHLORIDE 5 MG/ML
10 INJECTION INTRAMUSCULAR; INTRAVENOUS EVERY 8 HOURS PRN
Status: DISCONTINUED | OUTPATIENT
Start: 2024-08-09 | End: 2024-08-11

## 2024-08-09 RX ORDER — HYDROCODONE BITARTRATE AND ACETAMINOPHEN 5; 325 MG/1; MG/1
1 TABLET ORAL EVERY 6 HOURS PRN
Status: DISCONTINUED | OUTPATIENT
Start: 2024-08-09 | End: 2024-08-09

## 2024-08-09 RX ORDER — SODIUM CHLORIDE, SODIUM LACTATE, POTASSIUM CHLORIDE, CALCIUM CHLORIDE 600; 310; 30; 20 MG/100ML; MG/100ML; MG/100ML; MG/100ML
INJECTION, SOLUTION INTRAVENOUS CONTINUOUS
Status: DISCONTINUED | OUTPATIENT
Start: 2024-08-09 | End: 2024-08-09 | Stop reason: HOSPADM

## 2024-08-09 RX ORDER — MORPHINE SULFATE 4 MG/ML
4 INJECTION, SOLUTION INTRAMUSCULAR; INTRAVENOUS EVERY 10 MIN PRN
Status: DISCONTINUED | OUTPATIENT
Start: 2024-08-09 | End: 2024-08-09 | Stop reason: HOSPADM

## 2024-08-09 RX ORDER — HYDROMORPHONE HYDROCHLORIDE 1 MG/ML
0.2 INJECTION, SOLUTION INTRAMUSCULAR; INTRAVENOUS; SUBCUTANEOUS EVERY 5 MIN PRN
Status: DISCONTINUED | OUTPATIENT
Start: 2024-08-09 | End: 2024-08-09 | Stop reason: HOSPADM

## 2024-08-09 RX ORDER — HYDROMORPHONE HYDROCHLORIDE 1 MG/ML
0.4 INJECTION, SOLUTION INTRAMUSCULAR; INTRAVENOUS; SUBCUTANEOUS EVERY 5 MIN PRN
Status: DISCONTINUED | OUTPATIENT
Start: 2024-08-09 | End: 2024-08-09 | Stop reason: HOSPADM

## 2024-08-09 RX ORDER — ONDANSETRON 2 MG/ML
4 INJECTION INTRAMUSCULAR; INTRAVENOUS ONCE
Status: COMPLETED | OUTPATIENT
Start: 2024-08-09 | End: 2024-08-09

## 2024-08-09 RX ORDER — ONDANSETRON 2 MG/ML
4 INJECTION INTRAMUSCULAR; INTRAVENOUS EVERY 6 HOURS PRN
Status: DISCONTINUED | OUTPATIENT
Start: 2024-08-09 | End: 2024-08-09 | Stop reason: HOSPADM

## 2024-08-09 RX ORDER — HYDROMORPHONE HYDROCHLORIDE 1 MG/ML
0.6 INJECTION, SOLUTION INTRAMUSCULAR; INTRAVENOUS; SUBCUTANEOUS EVERY 5 MIN PRN
Status: DISCONTINUED | OUTPATIENT
Start: 2024-08-09 | End: 2024-08-09 | Stop reason: HOSPADM

## 2024-08-09 RX ORDER — HYDROCODONE BITARTRATE AND ACETAMINOPHEN 5; 325 MG/1; MG/1
2 TABLET ORAL EVERY 4 HOURS PRN
Status: DISCONTINUED | OUTPATIENT
Start: 2024-08-09 | End: 2024-08-11

## 2024-08-09 RX ORDER — ONDANSETRON 2 MG/ML
4 INJECTION INTRAMUSCULAR; INTRAVENOUS EVERY 6 HOURS PRN
Status: DISCONTINUED | OUTPATIENT
Start: 2024-08-09 | End: 2024-08-11

## 2024-08-09 RX ORDER — LEVOTHYROXINE SODIUM 0.12 MG/1
125 TABLET ORAL EVERY EVENING
Status: DISCONTINUED | OUTPATIENT
Start: 2024-08-09 | End: 2024-08-11

## 2024-08-09 RX ORDER — HYDROCODONE BITARTRATE AND ACETAMINOPHEN 5; 325 MG/1; MG/1
2 TABLET ORAL ONCE AS NEEDED
Status: DISCONTINUED | OUTPATIENT
Start: 2024-08-09 | End: 2024-08-09 | Stop reason: HOSPADM

## 2024-08-09 RX ORDER — NALOXONE HYDROCHLORIDE 0.4 MG/ML
80 INJECTION, SOLUTION INTRAMUSCULAR; INTRAVENOUS; SUBCUTANEOUS AS NEEDED
Status: DISCONTINUED | OUTPATIENT
Start: 2024-08-09 | End: 2024-08-09 | Stop reason: HOSPADM

## 2024-08-09 RX ORDER — MAGNESIUM CARB/ALUMINUM HYDROX 105-160MG
1 TABLET,CHEWABLE ORAL DAILY
COMMUNITY
Start: 2022-05-15

## 2024-08-09 RX ORDER — ACETAMINOPHEN 325 MG/1
650 TABLET ORAL EVERY 4 HOURS PRN
Status: DISCONTINUED | OUTPATIENT
Start: 2024-08-09 | End: 2024-08-11

## 2024-08-09 RX ORDER — IOPAMIDOL 612 MG/ML
INJECTION, SOLUTION INTRATHECAL AS NEEDED
Status: DISCONTINUED | OUTPATIENT
Start: 2024-08-09 | End: 2024-08-09 | Stop reason: HOSPADM

## 2024-08-09 RX ORDER — METOCLOPRAMIDE HYDROCHLORIDE 5 MG/ML
10 INJECTION INTRAMUSCULAR; INTRAVENOUS EVERY 8 HOURS PRN
Status: DISCONTINUED | OUTPATIENT
Start: 2024-08-09 | End: 2024-08-09 | Stop reason: HOSPADM

## 2024-08-09 RX ORDER — POTASSIUM CHLORIDE 20 MEQ/1
40 TABLET, EXTENDED RELEASE ORAL ONCE
Status: COMPLETED | OUTPATIENT
Start: 2024-08-09 | End: 2024-08-09

## 2024-08-09 RX ORDER — SODIUM CHLORIDE 9 MG/ML
INJECTION, SOLUTION INTRAVENOUS CONTINUOUS
Status: DISCONTINUED | OUTPATIENT
Start: 2024-08-09 | End: 2024-08-10

## 2024-08-09 NOTE — H&P
[facility] Hospitalist H&P       CC: abdominal pain     PCP: Kirill Harper MD    History of Present Illness:   74 year old female with hx of HTN, asthma, hx of TIA, Hyperlipidemia, hypothyroidism, who is here for left flank pain and abdominal pain. She denied fever at home. She stated that this felt like prior stones in the past. CT showed obstructing left distal ureteral stone. Urology consulted from ER.     Review of Systems  Comprehensive ROS reviewed and negative except for what's stated above.     PMH  Past Medical History:    Asthma (HCC)    seasonal    Calculus of kidney    CVA (cerebral vascular accident) (HCC)    Disorder of thyroid    Essential hypertension    High blood pressure    High cholesterol    History of blood transfusion    Itching    Hx of lithotripsy    Hyperlipidemia    Osteoarthritis    Sepsis (HCC)    Stroke (HCC)    TIA    Thyroid disease    Visual impairment    Glasses        PSH  Past Surgical History:   Procedure Laterality Date    Cholecystectomy      Colon ca scrn not hi rsk ind N/A 4/16/2015    Procedure: COLONOSCOPY, POSSIBLE BIOPSY, POSSIBLE POLYPECTOMY 77139;  Surgeon: Trip Camacho MD;  Location: [facility]    Colonoscopy N/A 2/21/2022    Procedure: COLONOSCOPY, with polypectomy;  Surgeon: Brandon Ocasio MD;  Location: [facility]    Cystoscopy,insert ureteral stent      Other  09/20/2019    Cystoscopy, dilation, B/L RPG, Rt URS, LL, stone extraction-Dr. Doherty    Other surgical history  11/06/2019    cysto stent removal dr vargas    Other surgical history  10/29/2019     cysto, left urs, left rpg, stone extractino, magnolia laser litho, left stent exchange    Other surgical history  10/28/2021    cysto stent removal Dr. Vargas    Removal gallbladder          ALL:  Allergies   Allergen Reactions    Codeine ITCHING        Home Medications:  Reviewed with patient    Soc Hx  Social History     Tobacco Use    Smoking status: Former      Current packs/day: 0.50     Types: Cigarettes    Smokeless tobacco: Never    Tobacco comments:     quit about 40 yrs. ago   Substance Use Topics    Alcohol use: Yes     Alcohol/week: 7.0 standard drinks of alcohol     Types: 7 Standard drinks or equivalent per week     Comment: 1 viri per day        Fam Hx  Family History   Problem Relation Age of Onset    Heart Disorder Father         age 95    Stroke Father     Breast Cancer Mother         age 75    Cancer Sister     Obesity Brother     Hypertension Brother      OBJECTIVE:  /60   Pulse 55   Temp 97.4 °F (36.3 °C) (Temporal)   Resp 20   Ht 5' 8\" (1.727 m)   Wt 220 lb (99.8 kg)   SpO2 98%   BMI 33.45 kg/m²   General: Alert, no acute distress  Lungs: clear to ausculation bilaterally  Heart: Regular rate and rhythm  Abdomen: soft, non tender  Extremities: No edema    Diagnostic Data:    CBC/Chem  Recent Labs   Lab 08/09/24  1314   WBC 11.9*   HGB 15.1   MCV 96.1   .0       Recent Labs   Lab 08/09/24  1314      K 3.3*      CO2 30.0   BUN 13   CREATSERUM 1.04*   *   CA 9.4       Recent Labs   Lab 08/09/24  1314   ALT 11   AST 21   ALB 4.3     Radiology: CT ABDOMEN+PELVIS KIDNEYSTONE 2D RNDR(NO IV,NO ORAL)(CPT=74176)    Result Date: 8/9/2024  CONCLUSION: 6 x 5 millimeter distal left ureteral stone causing mild left hydroureteronephrosis    Dictated by (CST): Darrell Jerry MD on 8/09/2024 at 2:23 PM     Finalized by (CST): Darrell Jerry MD on 8/09/2024 at 2:28 PM           ASSESSMENT / PLAN:   74 year old female with hx of HTN, asthma, hx of TIA, Hyperlipidemia, hypothyroidism, who is here for left flank pain and abdominal pain.    Left ureteral stone  -symptomatic, with mild hydronephrosis, Cr 1.04  -to OR this evening for ureteral stent placement   -received IV rocephin for antibiotics, continue   -IV fluids     Hypertension  -hold her bisoprolol-hydrochlorothiazide until re-evaluated tomorrow. Will check renal function in  AM    Hyperlipidemia  -on lipitor 40mg daily    Hypothyroidism  -on synthroid    Fluids: 0.9 saline  Diet: NPO for OR, can eat after procedure  DVT prophylaxis: hold   Code status: FULL CODE    Berkley Clinton DO  [facility]

## 2024-08-09 NOTE — ED PROVIDER NOTES
Patient Seen in: [facility] Emergency Department      History     Chief Complaint   Patient presents with    Abdomen/Flank Pain     Stated Complaint: Abdominal Pain    Subjective:   HPI    74-year-old female presents for evaluation for abdominal pain and nausea.  Symptoms began yesterday.  Pain comes and goes and is moderate to severe.  Pain feels reminiscent of previous kidney stones.  She denies any fevers, chills, diarrhea.  She does feel a bit constipated.    Objective:   Past Medical History:    Asthma (HCC)    seasonal    Calculus of kidney    CVA (cerebral vascular accident) (HCC)    Disorder of thyroid    Essential hypertension    High blood pressure    High cholesterol    History of blood transfusion    Itching    Hx of lithotripsy    Hyperlipidemia    Osteoarthritis    Sepsis (HCC)    Stroke (HCC)    TIA    Thyroid disease    Visual impairment    Glasses              Past Surgical History:   Procedure Laterality Date    Cholecystectomy      Colon ca scrn not hi rsk ind N/A 4/16/2015    Procedure: COLONOSCOPY, POSSIBLE BIOPSY, POSSIBLE POLYPECTOMY 40182;  Surgeon: Trip Camacho MD;  Location: [facility]    Colonoscopy N/A 2/21/2022    Procedure: COLONOSCOPY, with polypectomy;  Surgeon: Brandon Ocasio MD;  Location: [facility]    Cystoscopy,insert ureteral stent      Other  09/20/2019    Cystoscopy, dilation, B/L RPG, Rt URS, LL, stone extraction-Dr. Doherty    Other surgical history  11/06/2019    cysto stent removal dr vargas    Other surgical history  10/29/2019     cysto, left urs, left rpg, stone extractino, magnolia laser litho, left stent exchange    Other surgical history  10/28/2021    cysto stent removal Dr. Vargas    Removal gallbladder                  Social History     Socioeconomic History    Marital status: Life Partner   Tobacco Use    Smoking status: Former     Current packs/day: 0.50     Types: Cigarettes    Smokeless tobacco: Never    Tobacco  comments:     quit about 40 yrs. ago   Vaping Use    Vaping status: Never Used   Substance and Sexual Activity    Alcohol use: Yes     Alcohol/week: 7.0 standard drinks of alcohol     Types: 7 Standard drinks or equivalent per week     Comment: 1 viri per day    Drug use: No              Review of Systems    Positive for stated Chief Complaint: Abdomen/Flank Pain    Other systems are as noted in HPI.  Constitutional and vital signs reviewed.      All other systems reviewed and negative except as noted above.    Physical Exam     ED Triage Vitals [08/09/24 1149]   /70   Pulse 64   Resp 22   Temp 97.4 °F (36.3 °C)   Temp src Temporal   SpO2 97 %   O2 Device None (Room air)       Current Vitals:   Vital Signs  BP: 112/70  Pulse: 64  Resp: 22  Temp: 97.4 °F (36.3 °C)  Temp src: Temporal    Oxygen Therapy  SpO2: 97 %  O2 Device: None (Room air)            Physical Exam  Vitals and nursing note reviewed.   Constitutional:       Appearance: Normal appearance.   HENT:      Head: Normocephalic and atraumatic.   Cardiovascular:      Rate and Rhythm: Normal rate and regular rhythm.      Pulses: Normal pulses.   Pulmonary:      Effort: Pulmonary effort is normal.      Breath sounds: Normal breath sounds.   Abdominal:      General: Bowel sounds are normal.      Palpations: Abdomen is soft.      Tenderness: There is no abdominal tenderness. There is no guarding or rebound.   Musculoskeletal:         General: Normal range of motion.      Cervical back: Normal range of motion.   Skin:     General: Skin is warm and dry.   Neurological:      General: No focal deficit present.      Mental Status: She is alert.               ED Course     Labs Reviewed   CBC WITH DIFFERENTIAL WITH PLATELET - Abnormal; Notable for the following components:       Result Value    WBC 11.9 (*)     RDW-SD 51.5 (*)     Neutrophil Absolute Prelim 8.27 (*)     Neutrophil Absolute 8.27 (*)     Monocyte Absolute 1.28 (*)     All other components within  normal limits   COMP METABOLIC PANEL (14)   URINALYSIS WITH CULTURE REFLEX                      MDM                                         Medical Decision Making  Differential diagnosis includes but is not limited to urinary tract infection, ureterolithiasis, diverticulitis, appendicitis, etc.    Patient was given some IV fluids, Toradol, Zofran.  CBC shows a leukocytosis.  Patient's chemistry and urinalysis are pending.  CT imaging also pending.  Patient is endorsed to incoming ED physician pending imaging and disposition.    Medical Record Review: I personally reviewed available prior medical records for any recent pertinent discharge summaries, testing, and procedures, and reviewed those reports.    Complicating factors: The patient  has a past medical history of Asthma (HCC), Calculus of kidney, CVA (cerebral vascular accident) (HCC) (1990), Disorder of thyroid, Essential hypertension, High blood pressure, High cholesterol, History of blood transfusion, lithotripsy, Hyperlipidemia, Osteoarthritis, Sepsis (HCC) (2018), Stroke (HCC) (2018), Thyroid disease, and Visual impairment. and  has a past surgical history that includes colon ca scrn not hi rsk ind (N/A, 4/16/2015); removal gallbladder; cholecystectomy; other (09/20/2019); other surgical history (11/06/2019); other surgical history (10/29/2019); other surgical history (10/28/2021); cystoscopy,insert ureteral stent; and colonoscopy (N/A, 2/21/2022). that contribute to the medical complexity of this ED evaluation.     Clinical impression as well as any lab results and radiology findings were discussed with the patient and/or caregiver. I personally reviewed all laboratory results and radiology images myself. Patient is advised to follow up with PCP for reevaluation. I provided discharge instructions and return precautions. Patient and/or caregiver voices understanding and agreement with the treatment plan. All questions were addressed and answered.          Problems Addressed:  Abdominal pain, acute: acute illness or injury with systemic symptoms    Amount and/or Complexity of Data Reviewed  External Data Reviewed: radiology.     Details: Patient CT scan from September 19, 2023 reviewed, she had a 9 mm obstructing distal right ureteral stone.  Labs: ordered. Decision-making details documented in ED Course.  Radiology: ordered.        Disposition and Plan     Clinical Impression:  1. Abdominal pain, acute         Disposition:  There is no disposition on file for this visit.  There is no disposition time on file for this visit.    Follow-up:  No follow-up provider specified.  We recommend that you schedule follow up care with a primary care provider within the next three months to obtain basic health screening including reassessment of your blood pressure.      Medications Prescribed:  Current Discharge Medication List

## 2024-08-09 NOTE — ED QUICK NOTES
Orders for admission, patient is aware of plan and ready to go upstairs. Any questions, please call ED RN dani at extension [phone].     Patient Covid vaccination status: Fully vaccinated     COVID Test Ordered in ED: None    COVID Suspicion at Admission: N/A    Running Infusions:      Mental Status/LOC at time of transport: AAOx4, NPO in ED    Other pertinent information:   CIWA score: N/A   NIH score:  N/A

## 2024-08-09 NOTE — ED QUICK NOTES
Pt still unable to urinate at this time  Aware we are waiting for urine sample  Reports improved pain

## 2024-08-09 NOTE — ED PROVIDER NOTES
Patient received in signout from Dr. Fregoso.  I was asked to follow-up CT abdomen pelvis and urinalysis.  Urinalysis is concerning for UTI and Rocephin was ordered.  CT is concerning for left sided obstructing ureteral stone with hydronephrosis. I d/w Dr. Yeung with urology who evaluated patient at bedside and will plan for stent today. D/w Dr Clinton for admission.      CT ABDOMEN+PELVIS KIDNEYSTONE 2D RNDR(NO IV,NO ORAL)(CPT=74176)    Result Date: 8/9/2024  CONCLUSION: 6 x 5 millimeter distal left ureteral stone causing mild left hydroureteronephrosis    Dictated by (CST): Darrell Jerry MD on 8/09/2024 at 2:23 PM     Finalized by (CST): Darrell Jerry MD on 8/09/2024 at 2:28 PM

## 2024-08-09 NOTE — ANESTHESIA PREPROCEDURE EVALUATION
Anesthesia PreOp Note    HPI:     ZAKI Stout is a 74 year old female who presents for preoperative consultation requested by: Julio Yeung DO    Date of Surgery: 8/9/2024    Procedure(s):  CYSTOSCOPY, LEFT RETROGRADE PYELOGRAM, LEFT URETERAL STENT PLACEMENT  Indication: Left ureteral stone [N20.1]  UTI (urinary tract infection) [N39.0]    Relevant Problems   No relevant active problems       NPO:                 NPO: Yes       History Review:  Patient Active Problem List    Diagnosis Date Noted    Abdominal pain, acute 08/09/2024    Kidney stone 09/19/2023    Positive colorectal cancer screening using Cologuard test 02/13/2022    Bilateral carotid artery disease, unspecified type (HCC) 02/13/2022    Vitamin B 12 deficiency 02/13/2022    Vitamin D deficiency 02/13/2022    Cerebellar atrophy (HCC) 11/21/2019    Recurrent kidney stones 11/07/2019    Obesity, morbid, BMI 40.0-49.9 (HCC) 08/14/2019    Bilateral carotid artery occlusion 06/04/2018    Thoracic aorta atherosclerosis (HCC) 06/04/2018    Patent foramen ovale with right to left shunt (HCC) 05/16/2018    History of CVA (cerebrovascular accident)     Acquired hypothyroidism 05/30/2014    Essential hypertension 04/19/2013       Past Medical History:    Asthma (HCC)    seasonal    Calculus of kidney    CVA (cerebral vascular accident) (HCC)    Disorder of thyroid    Essential hypertension    High blood pressure    High cholesterol    History of blood transfusion    Itching    Hx of lithotripsy    Hyperlipidemia    Osteoarthritis    Sepsis (HCC)    Stroke (HCC)    TIA    Thyroid disease    Visual impairment    Glasses       Past Surgical History:   Procedure Laterality Date    Cholecystectomy      Colon ca scrn not hi rsk ind N/A 4/16/2015    Procedure: COLONOSCOPY, POSSIBLE BIOPSY, POSSIBLE POLYPECTOMY 59053;  Surgeon: Trip Camacho MD;  Location: [facility]    Colonoscopy N/A 2/21/2022    Procedure: COLONOSCOPY, with polypectomy;   Surgeon: Brandon Ocasio MD;  Location: [facility]    Cystoscopy,insert ureteral stent      Other  09/20/2019    Cystoscopy, dilation, B/L RPG, Rt URS, LL, stone extraction-Dr. Doherty    Other surgical history  11/06/2019    cysto stent removal dr vargas    Other surgical history  10/29/2019     cysto, left urs, left rpg, stone extractino, magnolia laser litho, left stent exchange    Other surgical history  10/28/2021    cysto stent removal Dr. Vargas    Removal gallbladder         (Not in a hospital admission)    No current [facility]-ordered facility-administered medications on file.     Current Outpatient Medications Ordered in Epic   Medication Sig Dispense Refill    Glucosamine-Chondroitin 750-600 MG Oral Tab Take 1 tablet by mouth daily.      CRANBERRY OR Take 1 tablet by mouth 2 (two) times daily.      VITAMIN D OR Take 1 tablet by mouth daily.      Multiple Vitamins-Minerals (HEALTHY EYES OR) Take 1 tablet by mouth every evening.      B Complex-C (VITAMIN B + C COMPLEX OR) Take 1 tablet by mouth daily.      Omega-3 Fatty Acids (FISH OIL OR) Take 1 tablet by mouth daily.      Multiple Vitamins-Minerals (PRESERVISION AREDS OR) Take 1 tablet by mouth every morning.      Multiple Vitamins-Minerals (HAIR SKIN NAILS OR) Take 2 tablets by mouth daily.      Multiple Vitamins-Minerals (ONE-A-DAY WOMENS 50+ OR) Take by mouth.      aspirin 325 MG Oral Tab Take 1 tablet (325 mg total) by mouth daily.      LEVOTHYROXINE SODIUM 125 MCG Oral Tab TAKE 1 TABLET (125 MCG TOTAL) BY MOUTH BEFORE BREAKFAST. (Patient taking differently: Take 1 tablet (125 mcg total) by mouth every evening.) 90 tablet 3    BISOPROLOL-HYDROCHLOROTHIAZIDE 2.5-6.25 MG Oral Tab TAKE 1 TABLET EVERY DAY 90 tablet 3    ATORVASTATIN 40 MG Oral Tab TAKE 1 TABLET (40 MG TOTAL) BY MOUTH ONCE DAILY. 90 tablet 3    Turmeric Curcumin 500 MG Oral Cap Take 500 mg by mouth 2 (two) times a day. 180 capsule 3       Allergies   Allergen Reactions    Codeine  ITCHING       Family History   Problem Relation Age of Onset    Heart Disorder Father         age 95    Stroke Father     Breast Cancer Mother         age 75    Cancer Sister     Obesity Brother     Hypertension Brother      Social History     Socioeconomic History    Marital status: Life Partner   Tobacco Use    Smoking status: Former     Current packs/day: 0.50     Types: Cigarettes    Smokeless tobacco: Never    Tobacco comments:     quit about 40 yrs. ago   Vaping Use    Vaping status: Never Used   Substance and Sexual Activity    Alcohol use: Yes     Alcohol/week: 7.0 standard drinks of alcohol     Types: 7 Standard drinks or equivalent per week     Comment: 1 viri per day    Drug use: No       Available pre-op labs reviewed.  Lab Results   Component Value Date    WBC 11.9 (H) 08/09/2024    RBC 4.66 08/09/2024    HGB 15.1 08/09/2024    HCT 44.8 08/09/2024    MCV 96.1 08/09/2024    MCH 32.4 08/09/2024    MCHC 33.7 08/09/2024    RDW 14.6 08/09/2024    .0 08/09/2024     Lab Results   Component Value Date     08/09/2024    K 3.3 (L) 08/09/2024     08/09/2024    CO2 30.0 08/09/2024    BUN 13 08/09/2024    CREATSERUM 1.04 (H) 08/09/2024     (H) 08/09/2024    CA 9.4 08/09/2024          Vital Signs:  Body mass index is 33.45 kg/m².   height is 1.727 m (5' 8\") and weight is 99.8 kg (220 lb). Her temporal temperature is 97.4 °F (36.3 °C). Her blood pressure is 139/60 and her pulse is 55. Her respiration is 20 and oxygen saturation is 98%.   Vitals:    08/09/24 1149 08/09/24 1455 08/09/24 1635   BP: 112/70 115/71 139/60   Pulse: 64 71 55   Resp: 22 22 20   Temp: 97.4 °F (36.3 °C)     TempSrc: Temporal     SpO2: 97% 99% 98%   Weight: 99.8 kg (220 lb)     Height: 1.727 m (5' 8\")          Anesthesia Evaluation     Patient summary reviewed and Nursing notes reviewed    No history of anesthetic complications   Airway   Mallampati: II  TM distance: >3 FB  Neck ROM: full  Dental      Comment: Patient  denies any additional loose, missing, and chipped teeth.    Pulmonary - normal exam    breath sounds clear to auscultation  (+) asthma  (-) shortness of breath, recent URI, sleep apnea  Cardiovascular - normal exam  Exercise tolerance: good  (+) hypertension  (-) pacemaker, valvular problems/murmurs, past MI, CAD, CABG/stent, dysrhythmias, angina, CHF    Rhythm: regular  Rate: normal    Neuro/Psych    (+)  CVA,      (-) seizures, TIA    GI/Hepatic/Renal - negative ROS   (-) GERD, liver disease, renal disease    Endo/Other - negative ROS   (-) diabetes mellitus, blood dyscrasia  Abdominal                  Anesthesia Plan:   ASA:  3  Plan:   General  Airway:  ETT and LMA  Informed Consent Plan and Risks Discussed With:  Patient      I have informed ZAKI Stout and/or legal guardian or family member of the nature of the anesthetic plan, benefits, risks including possible dental damage if relevant, major complications, and any alternative forms of anesthetic management.   All of the patient's questions were answered to the best of my ability. The patient desires the anesthetic management as planned.  Albania Zuluaga MD  8/9/2024 5:34 PM  Present on Admission:  **None**

## 2024-08-09 NOTE — BRIEF OP NOTE
Pre-Operative Diagnosis: Left ureteral stone [N20.1]  UTI (urinary tract infection) [N39.0]     Post-Operative Diagnosis: Same     Procedure Performed:   CYSTOSCOPY, LEFT RETROGRADE PYELOGRAM, LEFT URETERAL STENT PLACEMENT    Surgeons and Role:     * Julio Yeung DO - Primary    Assistant(s):  None     Surgical Findings: Left distal ureteral stone, mild hydronephrosis     Specimen: None     Estimated Blood Loss: Minimal      Julio Yeung DO  8/9/2024  6:22 PM

## 2024-08-09 NOTE — ED QUICK NOTES
Pt sent to OR with friend at bedside  All belongings, clothing removed  Pt in stable condition upon transport

## 2024-08-09 NOTE — CONSULTS
[facility] ADULT HISTORY [facility]YSICAL      IDENTIFYING DATA  Patient is ZAKI Stout a 74 year old female. MRN is [id number]    Admitting physician: Dayron Watt  Primary care physician: Kirill Harper MD    CHIEF COMPLAINT  Chief Complaint   Patient presents with    Abdomen/Flank Pain             HISTORY OF PRESENT ILLNESS  74 year old female presents with left side flank and abdominal pain. CT scan with an obstructing left distal ureteral stone. UA concerning for a UTI. +Leukocytosis. Afebrile and hemodynamically stable.  consulted for further evaluation.   Hx of several stones in the past. Patient of Dr. Kevin Christy with [facility].     PAST UROLOGICAL HISTORY BY ORGAN SYSTEM  See HPI    PAST MEDICAL HISTORY  Past Medical History:    Asthma (HCC)    seasonal    Calculus of kidney    CVA (cerebral vascular accident) (HCC)    Disorder of thyroid    Essential hypertension    High blood pressure    High cholesterol    History of blood transfusion    Itching    Hx of lithotripsy    Hyperlipidemia    Osteoarthritis    Sepsis (HCC)    Stroke (HCC)    TIA    Thyroid disease    Visual impairment    Glasses             PAST SURGICAL HISTORY  Past Surgical History:   Procedure Laterality Date    Cholecystectomy      Colon ca scrn not hi rsk ind N/A 4/16/2015    Procedure: COLONOSCOPY, POSSIBLE BIOPSY, POSSIBLE POLYPECTOMY 77820;  Surgeon: Trip Camacho MD;  Location: [facility]    Colonoscopy N/A 2/21/2022    Procedure: COLONOSCOPY, with polypectomy;  Surgeon: Brandon Ocasio MD;  Location: [facility]    Cystoscopy,insert ureteral stent      Other  09/20/2019    Cystoscopy, dilation, B/L RPG, Rt URS, LL, stone extraction-Dr. Doherty    Other surgical history  11/06/2019    cysto stent removal dr vargas    Other surgical history  10/29/2019     cysto, left urs, left rpg, stone extractino, magnolia laser litho, left stent exchange    Other surgical history  10/28/2021    cysto stent  removal Dr. Brown    Removal gallbladder         PAST FAMILY HISTORY  Family History   Problem Relation Age of Onset    Heart Disorder Father         age 95    Stroke Father     Breast Cancer Mother         age 75    Cancer Sister     Obesity Brother     Hypertension Brother        PAST SOCIAL HISTORY  Social History     Socioeconomic History    Marital status: Life Partner     Spouse name: Not on file    Number of children: Not on file    Years of education: Not on file    Highest education level: Not on file   Occupational History    Not on file   Tobacco Use    Smoking status: Former     Current packs/day: 0.50     Types: Cigarettes    Smokeless tobacco: Never    Tobacco comments:     quit about 40 yrs. ago   Vaping Use    Vaping status: Never Used   Substance and Sexual Activity    Alcohol use: Yes     Alcohol/week: 7.0 standard drinks of alcohol     Types: 7 Standard drinks or equivalent per week     Comment: 1 viri per day    Drug use: No    Sexual activity: Not on file   Other Topics Concern    Not on file   Social History Narrative    Not on file     Social Determinants of Health     Financial Resource Strain: Not on file   Food Insecurity: Not on file   Transportation Needs: Not on file   Physical Activity: Not on file   Stress: Not on file   Social Connections: Not on file   Housing Stability: Not on file       MEDICATIONS    Current Outpatient Medications:     Glucosamine-Chondroitin 750-600 MG Oral Tab, Take 1 tablet by mouth daily., Disp: , Rfl:     LEVOTHYROXINE SODIUM 125 MCG Oral Tab, TAKE 1 TABLET (125 MCG TOTAL) BY MOUTH BEFORE BREAKFAST., Disp: 90 tablet, Rfl: 3    Turmeric Curcumin 500 MG Oral Cap, Take 500 mg by mouth 2 (two) times a day., Disp: 180 capsule, Rfl: 3    Multiple Vitamins-Minerals (HAIR SKIN NAILS OR), Take by mouth., Disp: , Rfl:     Multiple Vitamins-Minerals (ONE-A-DAY WOMENS 50+ OR), Take by mouth., Disp: , Rfl:     aspirin 325 MG Oral Tab, Take 1 tablet (325 mg total) by  mouth daily., Disp: , Rfl:     BISOPROLOL-HYDROCHLOROTHIAZIDE 2.5-6.25 MG Oral Tab, TAKE 1 TABLET EVERY DAY, Disp: 90 tablet, Rfl: 3    ATORVASTATIN 40 MG Oral Tab, TAKE 1 TABLET (40 MG TOTAL) BY MOUTH ONCE DAILY., Disp: 90 tablet, Rfl: 3    ALLERGIES  Allergies   Allergen Reactions    Codeine ITCHING          REVIEW OF SYSTEMS  Constitutional symptoms:(-) fever  Eyes: (-) blurred vision  Neurological: (-) tremors  Endocrine: (-) feeling too hot/cold   Gastrointestinal:(+)  abdominal pain   (-) nausea/vomiting   Cardiovascular: (-) chest pain  Integumentary: (-)  skin rash   Musculoskeletal: (-) back pain  Ear/Nose/Throat/Mouth:  (-) sore throat   Genitourinary:see HPI  Respiratory:  (-) SOB (-) DEE  Hematologic/Lymphatic: (-) blood clotting problem    PHYSICAL EXAMINATIONS    Vital Signs:   Vitals:    08/09/24 1149 08/09/24 1455 08/09/24 1635   BP: 112/70 115/71 139/60   Pulse: 64 71 55   Resp: 22 22 20   Temp: 97.4 °F (36.3 °C)     TempSrc: Temporal     SpO2: 97% 99% 98%   Weight: 220 lb (99.8 kg)     Height: 5' 8\" (1.727 m)         Constitutional: General appearance: appears well, alert and oriented x 3, pleasant and cooperative.  Neuro: No gross deficits  Skin: Normal color, turgor  HEENT: Neck supple  Chest: Normal respiratory effort  CV: Normal perfusion  Abdomen: Soft without tenderness, guarding. No suprapubic tenderness or fullness  No CVA tenderness bilaterally  Extremities: No edema appreciated.          REVIEW OF LABORATORY, PATHOLOGY, AND RADIOLOGY DATA    CBC w/DIF:   Lab Results   Component Value Date    WBC 11.9 (H) 08/09/2024    RBC 4.66 08/09/2024    HGB 15.1 08/09/2024    HCT 44.8 08/09/2024    MCV 96.1 08/09/2024    MCH 32.4 08/09/2024    MCHC 33.7 08/09/2024    RDW 14.6 08/09/2024    .0 08/09/2024    MPV 7.3 (L) 04/28/2018       Urine culture:  pending      ASSESSMENT AND PLAN BY PROBLEM  74 year old female presents with an obstructing left distal ureteral stone, bilateral renal stones,  UTI.     - I have reviewed the pertinent labs and imaging with the patient. Given obstruction with likely UTI, I have recommended renal decompression in the OR with ureteral stent placement. Alternatives including observation and nephrostomy tube were discussed. Patient in agreement.   - NPO for OR today  - Continue empiric antibiotics.   - Definitive stone management as outpatient with Dr. Christy after resolution of acute issues.     Thank you for this consult.      Julio Yeung DO  [facility]  Office [phone]

## 2024-08-09 NOTE — ED INITIAL ASSESSMENT (HPI)
Patient arrives with reports of low abdominal pain starting yesterday. Denies dysuria/frequency. Patient states she has hx of kidney stones.+ nausea. Denies fevers.

## 2024-08-10 LAB
ANION GAP SERPL CALC-SCNC: 6 MMOL/L (ref 0–18)
BASOPHILS # BLD AUTO: 0.06 X10(3) UL (ref 0–0.2)
BASOPHILS NFR BLD AUTO: 0.8 %
BUN BLD-MCNC: 15 MG/DL (ref 9–23)
BUN/CREAT SERPL: 18.1 (ref 10–20)
CALCIUM BLD-MCNC: 8.2 MG/DL (ref 8.7–10.4)
CHLORIDE SERPL-SCNC: 112 MMOL/L (ref 98–112)
CO2 SERPL-SCNC: 27 MMOL/L (ref 21–32)
CREAT BLD-MCNC: 0.83 MG/DL
DEPRECATED RDW RBC AUTO: 53.5 FL (ref 35.1–46.3)
EGFRCR SERPLBLD CKD-EPI 2021: 73 ML/MIN/1.73M2 (ref 60–?)
EOSINOPHIL # BLD AUTO: 0.27 X10(3) UL (ref 0–0.7)
EOSINOPHIL NFR BLD AUTO: 3.6 %
ERYTHROCYTE [DISTWIDTH] IN BLOOD BY AUTOMATED COUNT: 14.8 % (ref 11–15)
GLUCOSE BLD-MCNC: 140 MG/DL (ref 70–99)
HCT VFR BLD AUTO: 37.2 %
HGB BLD-MCNC: 12.2 G/DL
IMM GRANULOCYTES # BLD AUTO: 0.01 X10(3) UL (ref 0–1)
IMM GRANULOCYTES NFR BLD: 0.1 %
LYMPHOCYTES # BLD AUTO: 1.73 X10(3) UL (ref 1–4)
LYMPHOCYTES NFR BLD AUTO: 23 %
MCH RBC QN AUTO: 32.4 PG (ref 26–34)
MCHC RBC AUTO-ENTMCNC: 32.8 G/DL (ref 31–37)
MCV RBC AUTO: 98.7 FL
MONOCYTES # BLD AUTO: 0.74 X10(3) UL (ref 0.1–1)
MONOCYTES NFR BLD AUTO: 9.9 %
NEUTROPHILS # BLD AUTO: 4.7 X10 (3) UL (ref 1.5–7.7)
NEUTROPHILS # BLD AUTO: 4.7 X10(3) UL (ref 1.5–7.7)
NEUTROPHILS NFR BLD AUTO: 62.6 %
OSMOLALITY SERPL CALC.SUM OF ELEC: 303 MOSM/KG (ref 275–295)
PLATELET # BLD AUTO: 194 10(3)UL (ref 150–450)
POTASSIUM SERPL-SCNC: 3.2 MMOL/L (ref 3.5–5.1)
POTASSIUM SERPL-SCNC: 3.2 MMOL/L (ref 3.5–5.1)
RBC # BLD AUTO: 3.77 X10(6)UL
SODIUM SERPL-SCNC: 145 MMOL/L (ref 136–145)
WBC # BLD AUTO: 7.5 X10(3) UL (ref 4–11)

## 2024-08-10 PROCEDURE — 80048 BASIC METABOLIC PNL TOTAL CA: CPT | Performed by: INTERNAL MEDICINE

## 2024-08-10 PROCEDURE — 85025 COMPLETE CBC W/AUTO DIFF WBC: CPT | Performed by: INTERNAL MEDICINE

## 2024-08-10 PROCEDURE — 84132 ASSAY OF SERUM POTASSIUM: CPT | Performed by: INTERNAL MEDICINE

## 2024-08-10 RX ORDER — POTASSIUM CHLORIDE 20 MEQ/1
40 TABLET, EXTENDED RELEASE ORAL ONCE
Status: COMPLETED | OUTPATIENT
Start: 2024-08-10 | End: 2024-08-10

## 2024-08-10 NOTE — PROGRESS NOTES
[facility]    SUBJECTIVE  Patient seen and examined, chart reviewed. No acute events overnight. Complaining of nausea today. No issues voiding.       OBJECTIVE    Vital signs:   Vitals:    08/09/24 2109 08/09/24 2124 08/10/24 0540 08/10/24 1214   BP:  154/75 113/59 (!) 143/113   BP Location:  Right arm Right arm Right arm   Pulse:  (!) 49 51 50   Resp:  16 18 18   Temp:  97.7 °F (36.5 °C) 97.6 °F (36.4 °C) 97.4 °F (36.3 °C)   TempSrc:  Oral Oral Oral   SpO2:  95% 96% 95%   Weight: 234 lb (106.1 kg)      Height:           IOs:  08/08 1900 - 08/10 0659  In: 1100 [I.V.:1000]  Out: -     Physical examination:  Constitutional: General appearance: appears well, alert and oriented x 3, pleasant and cooperative.  HEENT: Neck supple  Chest: Normal respiratory effort  CV: Normal perfusion  Abdomen: Soft without tenderness, guarding.       REVIEW OF LABORATORY, PATHOLOGY, AND RADIOLOGY DATA    CBC:   Lab Results   Component Value Date    WBC 7.5 08/10/2024    HGB 12.2 08/10/2024    .0 08/10/2024       Urine culture:  E. coli      IMPRESSION/ PLAN  74 year old female presents with an obstructing left distal ureteral stone, bilateral renal stones, UTI. POD#1 cystoscopy, left ureteral stent placement. Urine culture growing E. Coli.     - Continue antibiotics.   - Outpatient definitive stone management with Dr. Christy.   - We will follow   - Appreciate Dr. Clinton's assistance with care of this patient.     Julio Yeung DO  [facility]  Office [phone]

## 2024-08-10 NOTE — PROGRESS NOTES
[facility] Hospitalist Progress Note     CC: Hospital Follow up    PCP: Kirill Harper MD       Assessment/Plan:   74 year old female with hx of HTN, asthma, hx of TIA, Hyperlipidemia, hypothyroidism, who is here for left flank pain and abdominal pain.     Left ureteral stone, POD # 1 from ureteral stent placement  -continue antibiotics, await urine culture. Gave option to go home on orals and I could follow up on culture but she prefers discharge tomorrow  -ok to stop IV fluids     Hypertension  -hold her bisoprolol-hydrochlorothiazide for now     Hyperlipidemia  -on lipitor 40mg daily     Hypothyroidism  -on synthroid     Fluids: can stop saline  Diet: general diet  DVT prophylaxis: hold   Code status: FULL CODE    Observation status, discharge tomorrow      [facility] Hospitalist        Subjective:     Still having some pain but feels better.    OBJECTIVE:    Blood pressure 113/59, pulse 51, temperature 97.6 °F (36.4 °C), temperature source Oral, resp. rate 18, height 5' 8\" (1.727 m), weight 234 lb (106.1 kg), SpO2 96%.    Temp:  [97.2 °F (36.2 °C)-98 °F (36.7 °C)] 97.6 °F (36.4 °C)  Pulse:  [47-71] 51  Resp:  [14-22] 18  BP: ()/(49-75) 113/59  SpO2:  [95 %-99 %] 96 %      Intake/Output:    Intake/Output Summary (Last 24 hours) at 8/10/2024 1144  Last data filed at 8/9/2024 1705  Gross per 24 hour   Intake 1100 ml   Output --   Net 1100 ml       Last 3 Weights   08/09/24 2109 234 lb (106.1 kg)   08/09/24 1149 220 lb (99.8 kg)   10/02/23 0904 229 lb (103.9 kg)   09/28/23 1436 232 lb (105.2 kg)   09/19/23 0752 244 lb (110.7 kg)       /59 (BP Location: Right arm)   Pulse 51   Temp 97.6 °F (36.4 °C) (Oral)   Resp 18   Ht 5' 8\" (1.727 m)   Wt 234 lb (106.1 kg)   SpO2 96%   BMI 35.58 kg/m²   General: Alert, no acute distress  Lungs: clear to ausculation bilaterally  Heart: Regular rate and rhythm  Abdomen: soft, non tender, non distended   Extremities: No  edema      Data Review:       Labs:     Recent Labs   Lab 08/09/24  1314 08/10/24  0457   RBC 4.66 3.77*   HGB 15.1 12.2   HCT 44.8 37.2   MCV 96.1 98.7   MCH 32.4 32.4   MCHC 33.7 32.8   RDW 14.6 14.8   NEPRELIM 8.27* 4.70   WBC 11.9* 7.5   .0 194.0         Recent Labs   Lab 08/09/24  1314 08/10/24  0457   * 140*   BUN 13 15   CREATSERUM 1.04* 0.83   EGFRCR 56* 73   CA 9.4 8.2*    145   K 3.3* 3.2*  3.2*    112   CO2 30.0 27.0       Recent Labs   Lab 08/09/24  1314   ALT 11   AST 21   ALB 4.3         Imaging:  XR OR - N/C    Result Date: 8/9/2024  CONCLUSION:  1. Nine saved images from a left-sided retrograde pyelogram performed by Dr. Yeung.  2. Radiologist was not in attendance during the procedure. 3. Fluoroscopy time 11.1 seconds. 4. DAP:  1.6373 Gy cm^2     Dictated by (CST): Zion Ann MD on 8/09/2024 at 11:02 PM     Finalized by (CST): Zion Ann MD on 8/09/2024 at 11:04 PM          CT ABDOMEN+PELVIS KIDNEYSTONE 2D RNDR(NO IV,NO ORAL)(CPT=74176)    Result Date: 8/9/2024  CONCLUSION: 6 x 5 millimeter distal left ureteral stone causing mild left hydroureteronephrosis    Dictated by (CST): Darrell Jerry MD on 8/09/2024 at 2:23 PM     Finalized by (CST): Darrell Jerry MD on 8/09/2024 at 2:28 PM             Meds:      potassium chloride  40 mEq Oral Once    atorvastatin  40 mg Oral Daily    levothyroxine  125 mcg Oral QPM    cefTRIAXone  1 g Intravenous Q24H      sodium chloride 100 mL/hr at 08/10/24 1032       acetaminophen **OR** HYDROcodone-acetaminophen **OR** HYDROcodone-acetaminophen    ondansetron    metoclopramide

## 2024-08-10 NOTE — PLAN OF CARE
Patient alert and orientated x 4.  Patient up x 1 with cane, voids freely.  Patient has IV fluids, Norco for pain.  Patient tolerating regular diet.  Patient has personal belongings and call light within reach.  Safety measures in place.  Problem: Patient Centered Care  Goal: Patient preferences are identified and integrated in the patient's plan of care  Description: Interventions:  - What would you like us to know as we care for you? My  stayed overnight with me.  - Provide timely, complete, and accurate information to patient/family  - Incorporate patient and family knowledge, values, beliefs, and cultural backgrounds into the planning and delivery of care  - Encourage patient/family to participate in care and decision-making at the level they choose  - Honor patient and family perspectives and choices  Outcome: Progressing     Problem: Patient/Family Goals  Goal: Patient/Family Long Term Goal  Description: Patient's Long Term Goal: To no longer have unmanageable pain    Interventions:  - Surgery stent placed  -Pain medications     - See additional Care Plan goals for specific interventions  Outcome: Progressing  Goal: Patient/Family Short Term Goal  Description: Patient's Short Term Goal: To go back home with my     Interventions:   - Continue to follow plan of care  -Take pain medications as needed  - See additional Care Plan goals for specific interventions  Outcome: Progressing

## 2024-08-10 NOTE — PLAN OF CARE
Patient alert, oriented, up to bathroom with stand by assistance, medicated for nausea as needed, continue IV ABX, tolerates diet well, possible discharge home tomorrow,  at bedside aware of plan of care.  Problem: Patient Centered Care  Goal: Patient preferences are identified and integrated in the patient's plan of care  Description: Interventions:  - What would you like us to know as we care for you?  - Provide timely, complete, and accurate information to patient/family  - Incorporate patient and family knowledge, values, beliefs, and cultural backgrounds into the planning and delivery of care  - Encourage patient/family to participate in care and decision-making at the level they choose  - Honor patient and family perspectives and choices  Outcome: Progressing     Problem: Patient/Family Goals  Goal: Patient/Family Long Term Goal  Description: Patient's Long Term Goal:     Interventions:  - See additional Care Plan goals for specific interventions  Outcome: Progressing  Goal: Patient/Family Short Term Goal  Description: Patient's Short Term Goal:     Interventions:   - See additional Care Plan goals for specific interventions  Outcome: Progressing

## 2024-08-10 NOTE — CM/SW NOTE
This is a Code 44. Patient failed inpatient criteria. Second level of review completed and supports observation.  UR committee in agreement.  Dr. Clinton placed OBS order and approves observation status.  MOON to be given to the patient.

## 2024-08-10 NOTE — ANESTHESIA POSTPROCEDURE EVALUATION
Patient: ZAKI Stout    Procedure Summary       Date: 08/09/24 Room / Location: [facility] MAIN OR  / [facility] MAIN OR    Anesthesia Start: 1755 Anesthesia Stop:     Procedure: CYSTOSCOPY, LEFT RETROGRADE PYELOGRAM, LEFT URETERAL STENT PLACEMENT (Left: Ureter) Diagnosis:       Left ureteral stone      UTI (urinary tract infection)      (Left ureteral stone [N20.1]UTI (urinary tract infection) [N39.0])    Surgeons: Julio Yeung DO Anesthesiologist: [place] Zuluaga MD    Anesthesia Type: general ASA Status: 3            Anesthesia Type: general    Vitals Value Taken Time   /59 08/10/24 0540   Temp 97.6 °F (36.4 °C) 08/10/24 0540   Pulse 51 08/10/24 0540   Resp 18 08/10/24 0540   SpO2 96 % 08/10/24 0540       EM AN Post Evaluation:   Patient Evaluated in PACU  Patient Participation: complete - patient participated  Level of Consciousness: sleepy but conscious  Pain Score: 0  Pain Management: adequate  Airway Patency:patent  Dental exam unchanged from preop  Yes    Nausea/Vomiting: none  Cardiovascular Status: acceptable  Respiratory Status: acceptable  Postoperative Hydration acceptable      [place]ia Zuluaga MD  8/10/2024 10:37 AM

## 2024-08-11 VITALS
DIASTOLIC BLOOD PRESSURE: 89 MMHG | RESPIRATION RATE: 18 BRPM | HEIGHT: 68 IN | OXYGEN SATURATION: 95 % | TEMPERATURE: 98 F | BODY MASS INDEX: 35.46 KG/M2 | WEIGHT: 234 LBS | HEART RATE: 57 BPM | SYSTOLIC BLOOD PRESSURE: 189 MMHG

## 2024-08-11 LAB — POTASSIUM SERPL-SCNC: 3.9 MMOL/L (ref 3.5–5.1)

## 2024-08-11 PROCEDURE — 84132 ASSAY OF SERUM POTASSIUM: CPT | Performed by: INTERNAL MEDICINE

## 2024-08-11 RX ORDER — CEFUROXIME AXETIL 500 MG/1
500 TABLET ORAL 2 TIMES DAILY
Qty: 14 TABLET | Refills: 0 | Status: SHIPPED | OUTPATIENT
Start: 2024-08-11 | End: 2024-08-18

## 2024-08-11 NOTE — PLAN OF CARE
Problem: Patient Centered Care  Goal: Patient preferences are identified and integrated in the patient's plan of care  Description: Interventions:  - What would you like us to know as we care for you?   - Provide timely, complete, and accurate information to patient/family  - Incorporate patient and family knowledge, values, beliefs, and cultural backgrounds into the planning and delivery of care  - Encourage patient/family to participate in care and decision-making at the level they choose  - Honor patient and family perspectives and choices  8/11/2024 0232 by Jodi Martinez RN  Outcome: Progressing  8/10/2024 2324 by Jodi Martinez RN  Outcome: Progressing     Problem: Patient/Family Goals  Goal: Patient/Family Long Term Goal  Description: Patient's Long Term Goal:     Interventions:  -   - See additional Care Plan goals for specific interventions  8/11/2024 0232 by Jodi Martinez RN  Outcome: Progressing  8/10/2024 2324 by Jodi Martinez RN  Outcome: Progressing  Goal: Patient/Family Short Term Goal  Description: Patient's Short Term Goal:     Interventions:   -   - See additional Care Plan goals for specific interventions  8/11/2024 0232 by Jodi Martinez RN  Outcome: Progressing  8/10/2024 2324 by Joid Martinez RN  Outcome: Progressing     Problem: PAIN - ADULT  Goal: Verbalizes/displays adequate comfort level or patient's stated pain goal  Description: INTERVENTIONS:  - Encourage pt to monitor pain and request assistance  - Assess pain using appropriate pain scale  - Administer analgesics based on type and severity of pain and evaluate response  - Implement non-pharmacological measures as appropriate and evaluate response  - Consider cultural and social influences on pain and pain management  - Manage/alleviate anxiety  - Utilize distraction and/or relaxation techniques  - Monitor for opioid side effects  - Notify MD/LIP if interventions unsuccessful or patient reports new pain  - Anticipate increased pain with  activity and pre-medicate as appropriate  8/11/2024 0232 by Jodi Martinez RN  Outcome: Progressing  8/10/2024 2324 by Jodi Martinez RN  Outcome: Progressing     Problem: SAFETY ADULT - FALL  Goal: Free from fall injury  Description: INTERVENTIONS:  - Assess pt frequently for physical needs  - Identify cognitive and physical deficits and behaviors that affect risk of falls.  - [place] fall precautions as indicated by assessment.  - Educate pt/family on patient safety including physical limitations  - Instruct pt to call for assistance with activity based on assessment  - Modify environment to reduce risk of injury  - Provide assistive devices as appropriate  - Consider OT/PT consult to assist with strengthening/mobility  - Encourage toileting schedule  8/11/2024 0232 by Jodi Martinez, RN  Outcome: Progressing  8/10/2024 2324 by Jodi Martinez, RN  Outcome: Progressing   No acute changes. Vital signs stable. Denies pain or nausea. Ambulates with standby and walker. Tolerating diet. Voids freely. IV antibiotics continued. Possible discharge today.

## 2024-08-11 NOTE — DISCHARGE SUMMARY
General Medicine Discharge Summary     Patient ID:  ZAKI Stout  74 year old  4/10/1950    Admit date: 8/9/2024    Discharge date and time: 8/11/24    Attending Physician: Berkley Clinton DO     Primary Care Physician: Kirill Harper MD     Discharge Diagnoses:   Cystitis [N30.90]  Abdominal pain, acute [R10.9]  Ureteral stone with hydronephrosis [N13.2]    Discharge Condition: stable    Disposition: home    Important Follow up:      Kevin Christy MD  [address]  [address]  [place] [id number] [phone]    Follow up  Follow-up with Dr. Christy to plan stone extraction    Kirill Harper MD  [address]  [address]  [place] [id number] [phone]    Follow up  coordinator will call to set up appointment      Hospital Course:    74 year old female with hx of HTN, asthma, hx of TIA, Hyperlipidemia, hypothyroidism, who is here for left flank pain and abdominal pain.     Left ureteral stone, POD # 2 from ureteral stent placement  -Outpatient definitive stone management with Dr. Kevin Christy.   -discharge with oral ceftin for another week     Hypertension  -can resume her bisoprolol-hydrochlorothiazide for now     Hyperlipidemia  -on lipitor 40mg daily     Hypothyroidism  -on synthroid    Consults:   IP CONSULT TO UROLOGY    Operative Procedures:   Procedure(s) (LRB):  CYSTOSCOPY, LEFT RETROGRADE PYELOGRAM, LEFT URETERAL STENT PLACEMENT (Left)       Patient instructions:        Current Discharge Medication List        START taking these medications    Details   cefuroxime 500 MG Oral Tab Take 1 tablet (500 mg total) by mouth 2 (two) times daily for 7 days.           CONTINUE these medications which have NOT CHANGED    Details   Glucosamine-Chondroitin 750-600 MG Oral Tab Take 1 tablet by mouth daily.      CRANBERRY OR Take 1 tablet by mouth 2 (two) times daily.      VITAMIN D OR Take 1 tablet by mouth daily.      !! Multiple Vitamins-Minerals (HEALTHY EYES OR)  Take 1 tablet by mouth every evening.      B Complex-C (VITAMIN B + C COMPLEX OR) Take 1 tablet by mouth daily.      Omega-3 Fatty Acids (FISH OIL OR) Take 1 tablet by mouth daily.      !! Multiple Vitamins-Minerals (PRESERVISION AREDS OR) Take 1 tablet by mouth every morning.      !! Multiple Vitamins-Minerals (HAIR SKIN NAILS OR) Take 2 tablets by mouth daily.      !! Multiple Vitamins-Minerals (ONE-A-DAY WOMENS 50+ OR) Take by mouth.      aspirin 325 MG Oral Tab Take 1 tablet (325 mg total) by mouth daily.      LEVOTHYROXINE SODIUM 125 MCG Oral Tab TAKE 1 TABLET (125 MCG TOTAL) BY MOUTH BEFORE BREAKFAST.      BISOPROLOL-HYDROCHLOROTHIAZIDE 2.5-6.25 MG Oral Tab TAKE 1 TABLET EVERY DAY      ATORVASTATIN 40 MG Oral Tab TAKE 1 TABLET (40 MG TOTAL) BY MOUTH ONCE DAILY.      Turmeric Curcumin 500 MG Oral Cap Take 500 mg by mouth 2 (two) times a day.       !! - Potential duplicate medications found. Please discuss with provider.        Code Status: Full Code    Exam on day of discharge:     Vitals:    08/11/24 1133   BP: (!) 189/89   Pulse: 57   Resp: 18   Temp: 97.6 °F (36.4 °C)       General: no acute distress  Heart: RRR  Lungs: clear bilaterally  Abdomen: nontender  Extremities: no pedal edema     Total time coordinating care for discharge: Greater than 30 minutes    Berkley Clinton DO

## 2024-08-11 NOTE — PROGRESS NOTES
-Likely due to a vasovagal episode.   -Decreased Coreg.   -Monitor orthostatics.      [facility]    SUBJECTIVE  Patient seen and examined, chart reviewed. No acute events overnight. Nausea resolved. No pain.       Current Outpatient Medications:     cefuroxime 500 MG Oral Tab, Take 1 tablet (500 mg total) by mouth 2 (two) times daily for 7 days., Disp: 14 tablet, Rfl: 0    OBJECTIVE    Vital signs:   Vitals:    08/10/24 2029 08/11/24 0607 08/11/24 0627 08/11/24 1133   BP: 155/69 (!) 163/75 149/71 (!) 189/89   BP Location: Right arm Right arm Left arm Right leg   Pulse: 60 59  57   Resp: 19 16  18   Temp: 97.7 °F (36.5 °C) 97.4 °F (36.3 °C)  97.6 °F (36.4 °C)   TempSrc: Oral Oral  Oral   SpO2: 95% 95%  95%   Weight:       Height:           IOs:  08/09 1900 - 08/11 0659  In: 120 [P.O.:120]  Out: -     Physical examination:  Constitutional: General appearance: appears well, alert and oriented x 3, pleasant and cooperative.  HEENT: Neck supple  Chest: Normal respiratory effort  CV: Normal perfusion  Abdomen: Soft without tenderness    REVIEW OF LABORATORY, PATHOLOGY, AND RADIOLOGY DATA    CBC:   Lab Results   Component Value Date    WBC 7.5 08/10/2024    HGB 12.2 08/10/2024    .0 08/10/2024       Urine culture:  E. Coli      IMPRESSION/ PLAN  74 year old female presents with an obstructing left distal ureteral stone, bilateral renal stones, UTI. POD#2 cystoscopy, left ureteral stent placement. Urine culture growing E. Coli.      - Continue antibiotics. OK to transition to oral from  standpoint.  - Outpatient definitive stone management with Dr. Christy.   - We will follow peripherally  - Appreciate Dr. Clinton's assistance with care of this patient.   - OK for discharge from  standpoint         Julio Yeung DO  [facility]  Office [phone]

## 2024-08-14 NOTE — OPERATIVE REPORT
[facility]    PATIENT'S NAME: ZAKI WEINER   ATTENDING PHYSICIAN: Berkley Clinton DO   OPERATING PHYSICIAN: Julio Yeung DO   PATIENT ACCOUNT#:   [id number]    LOCATION:  [address] [place] [address]  MEDICAL RECORD #:   [id number]       YOB: 1950  ADMISSION DATE:       08/09/2024      OPERATION DATE:  08/09/2024    OPERATIVE REPORT      PREOPERATIVE DIAGNOSIS:  Left ureteral stone and urinary tract infection.  POSTOPERATIVE DIAGNOSIS:  Left ureteral stone and urinary tract infection.  PROCEDURE:  Cystoscopy, left retrograde pyelogram, left ureteral stent placement.    ASSISTANT:  None.    ANESTHESIA:  General.    ESTIMATED BLOOD LOSS:  Minimal.    INTRAVENOUS FLUIDS:  See Anesthesia notes.    URINE OUTPUT:  Not recorded.    DRAINS:  Left 6-Kinyarwanda x 26 cm JJ ureteral stent.    SPECIMENS:  None.    COMPLICATIONS:  None.    DISPOSITION:  Stable to the recovery room.    INDICATIONS:  This is a 74-year-old female who presented with left side flank and abdominal pain, history of nephrolithiasis.  CT scan confirmed the stone within the distal ureter.  There is a concern for possible urinary tract infection.  Thus, we are placing a ureteral stent for renal decompression.  Risks including, but not limited to, bleeding, infection, pain, ureteral injury, need for further procedure, and stent-associated complications were discussed at length.  All good and pertinent questions were answered.  Informed consent was obtained.    FINDINGS:  Normal-appearing urethra.  Bladder without evidence of tumor, stone, or mucosal abnormality.  Left retrograde pyelogram with a stone in the left distal ureter with mild hydronephrosis.  Cloudy urine drained with placement of the stent.    OPERATIVE TECHNIQUE:  The patient was seen in the preoperative holding area where the consent was reviewed.  She was taken to the operative suite and placed on the table in supine position.  SCDs were applied.  General anesthetic and IV  antibiotics were administered by the anesthesia service.  After allowing adequate time for the anesthetic to take effect, the patient was placed in dorsal lithotomy position.  The perineum and genitalia were prepped and draped in usual sterile fashion.  Time-out was performed.    A 22-Mongolian cystoscope with a 30-degree lens was advanced into the bladder.  The bladder was inspected in its entirety with the results as above.  We turned our attention to the left ureteral orifice.  It was cannulated using a 5-Mongolian open-ended ureteral catheter.  Retrograde pyelogram performed with the results as above.  We advanced a Sensor wire into the collecting system without difficulty.  Over the wire, I placed a 6-Mongolian x 26 cm JJ ureteral stent without difficulty.  Fluoroscopy revealed good positioning of the stent within the collecting system and within the bladder.  Bladder was then drained.  The cystoscope removed.  The patient tolerated the procedure well, and there were no complications.    Dictated By Julio Yeung DO  d: 08/13/2024 10:18:20  t: 08/14/2024 00:01:24  [facility] [id number]  [facility]/

## (undated) DEVICE — MEDI-VAC NON-CONDUCTIVE SUCTION TUBING: Brand: CARDINAL HEALTH

## (undated) DEVICE — URETERAL ACCESS SHEATH SET: Brand: NAVIGATOR HD

## (undated) DEVICE — SOLUTION IRRIG 1000ML ST H2O AQUALITE PLAS

## (undated) DEVICE — PACK CUSTOM CYSTO

## (undated) DEVICE — ISOVUE-300. 61% INFUS BTL

## (undated) DEVICE — STERILE H2O FOR IRRIG .

## (undated) DEVICE — UROLOGY DRAIN BAG

## (undated) DEVICE — SLEEVE COMPR M KNEE LEN SGL USE KENDALL SCD

## (undated) DEVICE — SOL H2O IRRIGATION 3000ML

## (undated) DEVICE — GAMMEX® PI HYBRID SIZE 7.5, STERILE POWDER-FREE SURGICAL GLOVE, POLYISOPRENE AND NEOPRENE BLEND: Brand: GAMMEX

## (undated) DEVICE — SOLUTION IRRIG 3000ML 0.9% NACL FLX CONT

## (undated) DEVICE — HYDROGEL COATED URETERAL DILATOR: Brand: NOTTINGHAM ONE-STEP

## (undated) DEVICE — STERILE WATER 1000ML BTL

## (undated) DEVICE — FIBER LSR 200UM 2J 80HZ 60W DL FOR LITHO

## (undated) DEVICE — GUIDEWIRE ENDOSCP L150CM DIA0.035IN TIP 3CM

## (undated) DEVICE — OR TOWEL, 17" X 26" STERILE, BLUE: Brand: PREMIERPRO

## (undated) DEVICE — ZIPWIRE GUIDEWIRE .035X150 STR

## (undated) DEVICE — ENDOSCOPIC VALVE WITH ADAPTER.: Brand: SURSEAL® II

## (undated) DEVICE — SNAP KOVER: Brand: UNBRANDED

## (undated) DEVICE — CYSTO PACK: Brand: MEDLINE INDUSTRIES, INC.

## (undated) DEVICE — ZIPWIRE GUIDEWIRE 035X150 STR

## (undated) DEVICE — GAMMEX® PI HYBRID SIZE 8, STERILE POWDER-FREE SURGICAL GLOVE, POLYISOPRENE AND NEOPRENE BLEND: Brand: GAMMEX

## (undated) DEVICE — PAD,EYE,LARGE,2 1/8"X2 5/8",STERILE,LF: Brand: MEDLINE

## (undated) DEVICE — SOLUTION IRRIG 250ML 0.9% NACL

## (undated) DEVICE — SLEEVE KENDALL SCD EXPRESS MED

## (undated) DEVICE — Device

## (undated) DEVICE — SLEEVE COMPR MD KNEE LEN SGL USE KENDALL SCD

## (undated) DEVICE — TIGERTAIL 6F FLXTIP 70CM

## (undated) DEVICE — SOLUTION IRRIG 1000ML 0.9% NACL USP BTL

## (undated) DEVICE — VIAL ISOVUE 300 10X100ML

## (undated) DEVICE — NITINOL STONE RETRIEVAL BASKET: Brand: ZERO TIP

## (undated) NOTE — LETTER
[address] [place] [address]  Authorization for Invasive Procedures  1.  I hereby authorize ______________ , my physician and whomever may be designated as the doctor's assistant, to perform the following operation and/or procedure:  Gio performed for the purposes of advancing medicine, science, and/or education, provided my identity is not revealed. If the procedure has been videotaped, the physician/surgeon will obtain the original videotape.  The hospital will not be responsible for stor My signature below affirms that prior to the time of the procedure, I have explained to the patient and/or her legal representative, the risks and benefits involved in the proposed treatment and any reasonable alternative to the proposed treatment.  I have